# Patient Record
Sex: MALE | Race: WHITE | NOT HISPANIC OR LATINO
[De-identification: names, ages, dates, MRNs, and addresses within clinical notes are randomized per-mention and may not be internally consistent; named-entity substitution may affect disease eponyms.]

---

## 2019-06-21 ENCOUNTER — APPOINTMENT (OUTPATIENT)
Dept: ORTHOPEDIC SURGERY | Facility: CLINIC | Age: 69
End: 2019-06-21
Payer: MEDICARE

## 2019-06-21 VITALS
WEIGHT: 243 LBS | DIASTOLIC BLOOD PRESSURE: 84 MMHG | SYSTOLIC BLOOD PRESSURE: 147 MMHG | HEIGHT: 72 IN | BODY MASS INDEX: 32.91 KG/M2 | HEART RATE: 61 BPM

## 2019-06-21 DIAGNOSIS — S86.309A UNSPECIFIED INJURY OF MUSCLE(S) AND TENDON(S) OF PERONEAL MUSCLE GROUP AT LOWER LEG LEVEL, UNSPECIFIED LEG, INITIAL ENCOUNTER: ICD-10-CM

## 2019-06-21 PROCEDURE — 20612 ASPIRATE/INJ GANGLION CYST: CPT | Mod: RT

## 2019-06-21 PROCEDURE — 99204 OFFICE O/P NEW MOD 45 MIN: CPT | Mod: 25

## 2019-06-21 PROCEDURE — 73610 X-RAY EXAM OF ANKLE: CPT | Mod: RT

## 2019-06-21 NOTE — HISTORY OF PRESENT ILLNESS
[de-identified] : Patient presents today for an initial  evaluation of right lateral foot pain. He reports having pain for approximate 4-6 months. He does not recall a specific injury. He states the pain is over the lateral aspect of the foot. His knee worse when into a long distances. He is try some custom orthotics. This has helped the condition slightly. He does report his difficulty ambulating any lengthy distance. He does report intermittent swelling of the foot which is worse to the clinic end of the day. He is recently seen a podiatrist. He was sent for an MRI. MRI demonstrated peroneal tendon tear and he was referred to a foot and ankle specialist. The patient had previous right foot surgery with this office and had an exotosis of a tubercle. He denies of any medial foot pain. Her sensory changes. No other related complaints.\par \par Review of Systems-\par All other review of systems are negative except as noted. \par Constitutional: No fever or chills. \par Cardiovascular: No orthopnea or chest pain\par Pulmonary: No shortness of breath. \par Musculoskeletal: + joint pain, joint stiffness and joint swelling. \par Psychiatric: No anxiety and depression.

## 2019-06-21 NOTE — PROCEDURE
[Injection] : Injection [Right] : on the right.   [Tendon Sheath___] : [unfilled] ganglion cyst [Patient] : patient [Risk] : Risk [Benefits] : benefits [Bleeding] : bleeding [Infection] : infection [Verbal Consent Obtained] : verbal consent was obtained prior to the procedure [Ethyl Chloride Spray] : ethyl chloride spray was used as a topical anesthetic [Direct] : direct [1% Lidocaine___(mL)] : [unfilled] mL of 1% Lidocaine [Betameth. 3mg/mL___(mL)] : [unfilled] mL of 3mg/mL  betamethasone [Bandage Applied] : a bandage [No Strenuous Activity___day(s)] : avoid strenuous activity for [unfilled] day(s) [___ Week(s)] : in [unfilled] week(s) [de-identified] : chloraprep

## 2019-06-21 NOTE — DISCUSSION/SUMMARY
[de-identified] : Today's x-rays and a previous MRI reviewed with the patient. The patient was informed of the tendon tear as well as any other changes. At this time the patient is to be managed conservatively. The patient had an injection performed. He was placed into a lace up brace. He will be followed. She will return back in approximately 4 weeks reevaluation if he is not significantly improved at that time, surgical intervention may be considered. He will reduce activities to avoid exacerbation. All questions were answered to the patient's satisfaction.

## 2019-06-21 NOTE — PHYSICAL EXAM
[de-identified] : The patient appears well nourished and in no apparent distress. The patient is alert and oriented to person, place, and time. Affect and mood appear normal. The head is normocephalic and atraumatic. The eyes reveal normal sclera and extra ocular muscles are intact. The mucous membranes are moist. Skin shows normal turgor with no evidence of eczema or psoriasis. No respiratory distress noted. Sensation grossly intact. MUSCULOSKELETAL:   SEE BELOW\par \par Exam demonstrates normal alignment of the foot and ankle. No signs of acute trauma. No ecchymosis. No erythema. No tenderness the Achilles. The Achilles is intact. No calf tenderness. There is no medial ankle tenderness. No hindfoot tenderness and medial aspect. There is tenderness following the peroneus brevis inferior to the lateral malleolus. No tenderness of the fifth metatarsus space. No tenderness of the toes or the remainder of the metatarsus. Past range of motion of the ankle demonstrates approximately 5° of dorsiflexion and 40° of plantar flexion. Supination is approximately 25° and pronation is approximately 15°. There is some discomfort with terminal inversion. No pain with eversion. There is 5/5 motor strength against resisted ankle motions. There is discomfort with resisted inversion. No hemostasis. [de-identified] : MRI of the right foot dated May 24, 2002 was reviewed. No avascular necrosis. No fractures. Postsurgical changes of the calcaneus body is appreciated. Mild scarring of the lateral ligamentous complexes in her superior deltoid ligament is intact. Insertional tendinosis of the posterior tibial tendon. Complete tear of the peroneus longus attention to the cuboid tunnel with mild retraction of the torn fibers proximally with resulting full-thickness tendon gap measuring 1.7 cm in length. Severe pronation brevis tendinosis with partial thickness longitudinal tearing

## 2019-08-02 ENCOUNTER — APPOINTMENT (OUTPATIENT)
Dept: ORTHOPEDIC SURGERY | Facility: CLINIC | Age: 69
End: 2019-08-02

## 2022-06-07 ENCOUNTER — FORM ENCOUNTER (OUTPATIENT)
Age: 72
End: 2022-06-07

## 2022-06-08 ENCOUNTER — APPOINTMENT (OUTPATIENT)
Dept: ORTHOPEDIC SURGERY | Facility: CLINIC | Age: 72
End: 2022-06-08
Payer: MEDICARE

## 2022-06-08 ENCOUNTER — APPOINTMENT (OUTPATIENT)
Dept: MRI IMAGING | Facility: CLINIC | Age: 72
End: 2022-06-08
Payer: MEDICARE

## 2022-06-08 VITALS — HEIGHT: 72 IN | WEIGHT: 243 LBS | BODY MASS INDEX: 32.91 KG/M2

## 2022-06-08 PROCEDURE — 73721 MRI JNT OF LWR EXTRE W/O DYE: CPT | Mod: LT,MH

## 2022-06-08 PROCEDURE — 99214 OFFICE O/P EST MOD 30 MIN: CPT

## 2022-06-08 PROCEDURE — 73522 X-RAY EXAM HIPS BI 3-4 VIEWS: CPT

## 2022-06-08 RX ORDER — ZOLPIDEM TARTRATE 10 MG/1
10 TABLET ORAL
Qty: 30 | Refills: 0 | Status: ACTIVE | COMMUNITY
Start: 2022-03-25

## 2022-06-08 RX ORDER — TADALAFIL 20 MG/1
20 TABLET ORAL
Qty: 10 | Refills: 0 | Status: ACTIVE | COMMUNITY
Start: 2021-06-16

## 2022-06-08 NOTE — DISCUSSION/SUMMARY
[Medication Risks Reviewed] : Medication risks reviewed [de-identified] : Moist heat p.r.n.\par Discontinue Celebrex\par He can try diclofenac 75 mg b.i.d. 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

## 2022-06-08 NOTE — HISTORY OF PRESENT ILLNESS
[Gradual] : gradual [10] : 10 [Dull/Aching] : dull/aching [Radiating] : radiating [Sharp] : sharp [Constant] : constant [Leisure] : leisure [Nothing helps with pain getting better] : Nothing helps with pain getting better [Retired] : Work status: retired [de-identified] : The patient has had increased pain in both hips over the past 2 months.  No injury.  He had been in [place] and had been walking on uneven stand.  The left side bothers him more than the right.  He has mild to moderate pain standing and walking.  Pain after sitting and getting up.  His hips feel achy and sore.  Occasional awakening from sleep at night.  Some discomfort in his back at the present time.  No pain.  No radicular complaints.  Celebrex has not been helping him [] : Post Surgical Visit: no [FreeTextEntry7] : B Thighs  [de-identified] : 9/29/2021 [de-identified] : Dr. Hoover

## 2022-06-08 NOTE — PHYSICAL EXAM
[Normal Mood and Affect] : normal mood and affect [Able to Communicate] : able to communicate [Well Developed] : well developed [Well Nourished] : well nourished [FreeTextEntry3] : Healed midline incision [de-identified] : Straight leg raising is negative bilaterally [] : no swelling [4___] : abduction 4[unfilled]/5 [Bilateral] : hip with pelvis bilaterally [FreeTextEntry8] : Mild to moderate tenderness over the abductor insertion bilaterally [FreeTextEntry9] : Reviewed and interpreted.  Pelvis AP with lateral both hips-moderate to severe degenerative changes of the right hip.  Moderate degenerative changes of the left hip

## 2022-06-12 ENCOUNTER — NON-APPOINTMENT (OUTPATIENT)
Age: 72
End: 2022-06-12

## 2022-06-13 ENCOUNTER — APPOINTMENT (OUTPATIENT)
Dept: ORTHOPEDIC SURGERY | Facility: CLINIC | Age: 72
End: 2022-06-13
Payer: MEDICARE

## 2022-06-13 VITALS — HEIGHT: 72 IN | WEIGHT: 243 LBS | BODY MASS INDEX: 32.91 KG/M2

## 2022-06-13 DIAGNOSIS — R93.5 ABNORMAL FINDINGS ON DIAGNOSTIC IMAGING OF OTHER ABDOMINAL REGIONS, INCLUDING RETROPERITONEUM: ICD-10-CM

## 2022-06-13 DIAGNOSIS — M70.61 TROCHANTERIC BURSITIS, RIGHT HIP: ICD-10-CM

## 2022-06-13 DIAGNOSIS — M70.62 TROCHANTERIC BURSITIS, RIGHT HIP: ICD-10-CM

## 2022-06-13 PROCEDURE — 99214 OFFICE O/P EST MOD 30 MIN: CPT

## 2022-06-13 RX ORDER — CELECOXIB 200 MG/1
200 CAPSULE ORAL
Qty: 90 | Refills: 0 | Status: DISCONTINUED | COMMUNITY
Start: 2022-03-31 | End: 2022-06-13

## 2022-06-13 NOTE — DATA REVIEWED
[MRI] : MRI [Left] : left [Hip] : hip [FreeTextEntry1] : MRI left hip done June 8, 2022 was reviewed. There are mild to moderate degenerative changes. Moderate degenerative changes the right hip. Increase signal changes abductor tendons bilaterally. Bilateral trochanteric bursitis. \par

## 2022-06-13 NOTE — DISCUSSION/SUMMARY
[Medication Risks Reviewed] : Medication risks reviewed [de-identified] : MRI results left hip were discussed with the patient\par Moist heat p.r.n.\par Diclofenac 75 mg b.i.d. 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

## 2022-06-13 NOTE — HISTORY OF PRESENT ILLNESS
[Gradual] : gradual [4] : 4 [Dull/Aching] : dull/aching [Intermittent] : intermittent [Leisure] : leisure [Retired] : Work status: retired [de-identified] : The patient has continued pain in both hips.  The left side bothers him more than the right.  He has mild pain on the right, mild to moderate pain on the left.  Pain when standing and walking.  Pain after sitting and getting up.  Occasional awakening from sleep at night. He had MRI left hip [] : Post Surgical Visit: no [de-identified] : MRI

## 2022-06-13 NOTE — PHYSICAL EXAM
01/07/20 1802   Child Life   Location ED   Intervention Initial Assessment;Developmental Play  (CFL introduced self/services and provided toys for normalization of environment)   Anxiety Appropriate   Techniques to [place] with Loss/Stress/Change diversional activity;family presence      [Normal Mood and Affect] : normal mood and affect [Able to Communicate] : able to communicate [Well Developed] : well developed [Well Nourished] : well nourished [4___] : abduction 4[unfilled]/5 [FreeTextEntry3] : Healed midline incision [de-identified] : Straight leg raising is negative bilaterally [Bilateral] : hip bilaterally [] : no swelling [FreeTextEntry8] : Mild tenderness over the abductor insertion on the right, mild to moderate tenderness abductor insertion on the left [FreeTextEntry9] : Mild restriction of internal and external rotation right hip.  No pain with passive motion of the hips [de-identified] : Abductors 5 minus/5 on the right, 4+/5 on the left [de-identified] : Slight dystrophic gait

## 2022-08-01 ENCOUNTER — APPOINTMENT (OUTPATIENT)
Dept: ORTHOPEDIC SURGERY | Facility: CLINIC | Age: 72
End: 2022-08-01

## 2022-08-04 ENCOUNTER — RX RENEWAL (OUTPATIENT)
Age: 72
End: 2022-08-04

## 2022-08-08 ENCOUNTER — APPOINTMENT (OUTPATIENT)
Dept: ORTHOPEDIC SURGERY | Facility: CLINIC | Age: 72
End: 2022-08-08

## 2022-08-08 VITALS — WEIGHT: 243 LBS | BODY MASS INDEX: 32.91 KG/M2 | HEIGHT: 72 IN

## 2022-08-08 DIAGNOSIS — M19.041 PRIMARY OSTEOARTHRITIS, RIGHT HAND: ICD-10-CM

## 2022-08-08 DIAGNOSIS — M19.049 PRIMARY OSTEOARTHRITIS, UNSPECIFIED HAND: ICD-10-CM

## 2022-08-08 PROCEDURE — 20605 DRAIN/INJ JOINT/BURSA W/O US: CPT

## 2022-08-08 PROCEDURE — 73130 X-RAY EXAM OF HAND: CPT | Mod: RT

## 2022-08-08 PROCEDURE — 99213 OFFICE O/P EST LOW 20 MIN: CPT | Mod: 25

## 2022-08-08 RX ORDER — METHYLPREDNISOLONE ACETATE 40 MG/ML
40 INJECTION, SUSPENSION INTRA-ARTICULAR; INTRALESIONAL; INTRAMUSCULAR; SOFT TISSUE
Refills: 0 | Status: COMPLETED | OUTPATIENT
Start: 2022-08-08

## 2022-08-08 RX ADMIN — METHYLPREDNISOLONE ACETATE MG/ML: 40 INJECTION, SUSPENSION INTRA-ARTICULAR; INTRALESIONAL; INTRAMUSCULAR; SOFT TISSUE at 00:00

## 2022-08-08 NOTE — HISTORY OF PRESENT ILLNESS
[Gradual] : gradual [9] : 9 [3] : 3 [Dull/Aching] : dull/aching [Sharp] : sharp [Intermittent] : intermittent [Household chores] : household chores [Rest] : rest [Retired] : Work status: retired [de-identified] : Patient is a 71-year-old right-hand-dominant male with pain in the region of the base of the right thumb over the past 2 weeks.  No injury.  He had been doing a lot of bike riding.  He has moderate sharp pain when lifting and grasping.  Pain opening jars.  No numbness or tingling.  Taking Celebrex p.r.n.  He says this helps him more than the diclofenac [] : Post Surgical Visit: no [FreeTextEntry7] : R Forearm

## 2022-08-08 NOTE — PROCEDURE
[Medium Joint Injection] : medium joint injection [Right] : of the right [CMC Joint] : CMC joint [Pain] : pain [Alcohol] : alcohol [Betadine] : betadine [Ethyl Chloride sprayed topically] : ethyl chloride sprayed topically [Sterile technique used] : sterile technique used [___ cc    1%] : Lidocaine ~Vcc of 1%  [___ cc    40mg] : Methylprednisolone (Depomedrol) ~Vcc of 40 mg  [] : Patient tolerated procedure well [Patient was advised to rest the joint(s) for ____ days] : patient was advised to rest the joint(s) for [unfilled] days [Risks, benefits, alternatives discussed / Verbal consent obtained] : the risks benefits, and alternatives have been discussed, and verbal consent was obtained [FreeTextEntry3] : Do not submerge underwater for 24 hours

## 2022-08-08 NOTE — IMAGING
[de-identified] : Right hand\par \par No swelling\par Full active motion of the fingers and wrist\par Moderate tenderness over the basal joint\par Negative Finkelstein test\par Intrinsic strength is intact\par Sensation intact\par Radial pulse 2+ [Right] : right hand [FreeTextEntry1] : Reviewed and interpreted.  Right hand AP, lateral and oblique-moderate degenerative changes of the basal joint

## 2022-08-08 NOTE — DISCUSSION/SUMMARY
[Medication Risks Reviewed] : Medication risks reviewed [de-identified] : I offered the patient a cortisone injection in the basal joint.  Risks benefits and the alternatives were discussed.  He wanted to try this\par He did not want to use a splint\par Ice p.r.n.\par Discontinue diclofenac\par Celebrex 200 mg b.i.d. with food p.r.n.\par Tylenol p.r.n.\par He will avoid irritating activities\par \par Impression:\par Moderate basal joint osteoarthritis right hand

## 2022-10-03 ENCOUNTER — RX RENEWAL (OUTPATIENT)
Age: 72
End: 2022-10-03

## 2022-12-01 ENCOUNTER — APPOINTMENT (OUTPATIENT)
Dept: ORTHOPEDIC SURGERY | Facility: CLINIC | Age: 72
End: 2022-12-01

## 2022-12-01 VITALS — BODY MASS INDEX: 32.91 KG/M2 | WEIGHT: 243 LBS | HEIGHT: 72 IN

## 2022-12-01 PROCEDURE — 99213 OFFICE O/P EST LOW 20 MIN: CPT

## 2022-12-01 PROCEDURE — 73562 X-RAY EXAM OF KNEE 3: CPT | Mod: 50

## 2022-12-01 RX ORDER — DICLOFENAC SODIUM 75 MG/1
75 TABLET, DELAYED RELEASE ORAL
Qty: 60 | Refills: 1 | Status: COMPLETED | COMMUNITY
Start: 2022-06-08 | End: 2022-12-01

## 2022-12-01 NOTE — IMAGING
[Bilateral] : knee bilaterally [de-identified] : Mild dystrophic gait\par \par Right knee\par No swelling\par Mild medial facet and joint line tenderness\par Passive range of motion 0° to 120°\par Ligaments are stable\par Quad strength 5/5\par \par Left knee\par No swelling\par Mild medial facet and joint line tenderness\par Passive range of motion 0° to 120°\par Ligaments are stable\par Quad strength 5/5\par \par Both legs\par No swelling\par Calves are soft and nontender\par Posterior tibial pulse 2+ bilaterally [FreeTextEntry9] : Reviewed and interpreted.  Right knee AP standing, lateral, sunrise views-severe degenerative changes with narrowing the medial compartment on AP standing view, spurring patellofemoral joint\par \par Reviewed and interpreted.  Left knee AP standing, lateral, sunrise views-moderate to severe degenerative changes with narrowing the medial compartment on AP standing view, spurring patellofemoral joint

## 2022-12-01 NOTE — DISCUSSION/SUMMARY
[de-identified] : Ice p.r.n.\par Celebrex 200 mg b.i.d. with food p.r.n.\par Continue home exercises\par He would like to repeat Gelsyn injections in both knees.  Risks benefits and the alternatives were discussed\par \par Impression:\par Severe osteoarthritis right knee\par Moderate to severe osteoarthritis left knee

## 2022-12-01 NOTE — HISTORY OF PRESENT ILLNESS
[Gradual] : gradual [3] : 3 [Dull/Aching] : dull/aching [Intermittent] : intermittent [Leisure] : leisure [Rest] : rest [Injection therapy] : injection therapy [Stairs] : stairs [Retired] : Work status: retired [de-identified] : The patient has had increased pain in both knees over the past few weeks.  Mild pain standing and walking.  Mild to moderate pain with stairs and movie sign.  Taking Celebrex p.r.n.  Doing home exercises.  He did have good improvement after previous Gelsyn injections in both knees.  Seen today with his wife, Yanique [] : Post Surgical Visit: no [de-identified] : 3/24/2022 [de-identified] : Dr. Hoover

## 2022-12-08 ENCOUNTER — APPOINTMENT (OUTPATIENT)
Dept: ORTHOPEDIC SURGERY | Facility: CLINIC | Age: 72
End: 2022-12-08

## 2022-12-08 VITALS — HEIGHT: 72 IN | WEIGHT: 243 LBS | BODY MASS INDEX: 32.91 KG/M2

## 2022-12-08 PROCEDURE — 20611 DRAIN/INJ JOINT/BURSA W/US: CPT | Mod: 50

## 2022-12-08 RX ORDER — HYALURONATE SODIUM 16.8MG/2ML
16.8 SYRINGE (ML) INTRAARTICULAR
Refills: 0 | Status: COMPLETED | OUTPATIENT
Start: 2022-12-08

## 2022-12-08 RX ADMIN — Medication MG/2ML: at 00:00

## 2022-12-08 NOTE — IMAGING
[de-identified] : Mild dystrophic gait\par \par Right knee\par No swelling\par Mild medial facet and joint line tenderness\par Passive range of motion 0° to 120°\par \par Left knee\par No swelling\par Mild medial facet and joint line tenderness\par Passive range of motion 0° to 120°\par \par \par Both legs\par No swelling\par Calves are soft and nontender\par

## 2022-12-08 NOTE — HISTORY OF PRESENT ILLNESS
[Gradual] : gradual [3] : 3 [Dull/Aching] : dull/aching [Intermittent] : intermittent [Leisure] : leisure [Rest] : rest [Injection therapy] : injection therapy [Stairs] : stairs [Retired] : Work status: retired [1] : 1 [Gelsyn] : Gelsyn [de-identified] : The patient has continued pain in both knees.  Mild pain standing and walking.  Mild to moderate pain after sitting and getting up [] : Post Surgical Visit: no [de-identified] : 12/1/2022 [de-identified] : Dr. Hoover

## 2022-12-08 NOTE — DISCUSSION/SUMMARY
[de-identified] : Ice p.r.n.\par Celebrex 200 mg b.i.d. with food p.r.n.\par Continue home exercises\par He would like to repeat Gelsyn injections in both knees.  Risks benefits and the alternatives were discussed\par \par Impression:\par Severe osteoarthritis right knee\par Moderate to severe osteoarthritis left knee

## 2022-12-08 NOTE — PROCEDURE
[Large Joint Injection] : Large joint injection [Bilateral] : bilaterally of the [Knee] : knee [Pain] : pain [Alcohol] : alcohol [Betadine] : betadine [Ethyl Chloride sprayed topically] : ethyl chloride sprayed topically [Sterile technique used] : sterile technique used [Gel-Syn (16.8mg)] : 16.8mg of Gel-Syn [#1] : series #1 [] : Patient tolerated procedure well [Patient was advised to rest the joint(s) for ____ days] : patient was advised to rest the joint(s) for [unfilled] days [Risks, benefits, alternatives discussed / Verbal consent obtained] : the risks benefits, and alternatives have been discussed, and verbal consent was obtained [Altered anatomic landmarks d/t erosive arthritis] : altered anatomic landmarks d/t erosive arthritis [All ultrasound images have been permanently captured and stored accordingly in our picture archiving and communication system] : All ultrasound images have been permanently captured and stored accordingly in our picture archiving and communication system [FreeTextEntry3] : Do not submerge underwater for 24 hours

## 2022-12-15 ENCOUNTER — APPOINTMENT (OUTPATIENT)
Dept: ORTHOPEDIC SURGERY | Facility: CLINIC | Age: 72
End: 2022-12-15

## 2022-12-15 VITALS — WEIGHT: 243 LBS | BODY MASS INDEX: 32.91 KG/M2 | HEIGHT: 72 IN

## 2022-12-15 PROCEDURE — 20611 DRAIN/INJ JOINT/BURSA W/US: CPT | Mod: 50

## 2022-12-15 RX ORDER — HYALURONATE SODIUM 16.8MG/2ML
16.8 SYRINGE (ML) INTRAARTICULAR
Refills: 0 | Status: COMPLETED | OUTPATIENT
Start: 2022-12-15

## 2022-12-15 RX ADMIN — Medication MG/2ML: at 00:00

## 2022-12-15 NOTE — IMAGING
[de-identified] : Mild dystrophic gait\par \par Right knee\par No swelling\par Mild medial facet and joint line tenderness\par Passive range of motion 0° to 120°\par \par Left knee\par No swelling\par Mild medial facet and joint line tenderness\par Passive range of motion 0° to 120°\par \par \par Both legs\par No swelling\par Calves are soft and nontender\par

## 2022-12-15 NOTE — DISCUSSION/SUMMARY
[de-identified] : Ice p.r.n.\par Celebrex 200 mg b.i.d. with food p.r.n.\par Continue home exercises\par \par Impression:\par Severe osteoarthritis right knee\par Moderate to severe osteoarthritis left knee

## 2022-12-15 NOTE — PROCEDURE
[Large Joint Injection] : Large joint injection [Bilateral] : bilaterally of the [Knee] : knee [Pain] : pain [Alcohol] : alcohol [Betadine] : betadine [Ethyl Chloride sprayed topically] : ethyl chloride sprayed topically [Sterile technique used] : sterile technique used [Gel-Syn (16.8mg)] : 16.8mg of Gel-Syn [#2] : series #2 [] : Patient tolerated procedure well [Patient was advised to rest the joint(s) for ____ days] : patient was advised to rest the joint(s) for [unfilled] days [Risks, benefits, alternatives discussed / Verbal consent obtained] : the risks benefits, and alternatives have been discussed, and verbal consent was obtained [Altered anatomic landmarks d/t erosive arthritis] : altered anatomic landmarks d/t erosive arthritis [All ultrasound images have been permanently captured and stored accordingly in our picture archiving and communication system] : All ultrasound images have been permanently captured and stored accordingly in our picture archiving and communication system [FreeTextEntry3] : Do not submerge underwater for 24 hours

## 2022-12-15 NOTE — HISTORY OF PRESENT ILLNESS
[Gradual] : gradual [Dull/Aching] : dull/aching [Intermittent] : intermittent [Leisure] : leisure [Stairs] : stairs [Retired] : Work status: retired [3] : 3 [Gelsyn] : Gelsyn [de-identified] : The patient feels better after the first Gelsyn injections in both knees.  He has mild pain standing and walking.  Mild pain with stairs [] : Post Surgical Visit: no [de-identified] : 12/8/2022

## 2022-12-22 ENCOUNTER — APPOINTMENT (OUTPATIENT)
Dept: ORTHOPEDIC SURGERY | Facility: CLINIC | Age: 72
End: 2022-12-22

## 2022-12-22 VITALS — BODY MASS INDEX: 32.91 KG/M2 | WEIGHT: 243 LBS | HEIGHT: 72 IN

## 2022-12-22 PROCEDURE — 20611 DRAIN/INJ JOINT/BURSA W/US: CPT | Mod: 50

## 2022-12-22 RX ORDER — HYALURONATE SODIUM 16.8MG/2ML
16.8 SYRINGE (ML) INTRAARTICULAR
Refills: 0 | Status: COMPLETED | OUTPATIENT
Start: 2022-12-22

## 2022-12-22 RX ADMIN — Medication MG/2ML: at 00:00

## 2022-12-22 NOTE — PROCEDURE
[Large Joint Injection] : Large joint injection [Bilateral] : bilaterally of the [Knee] : knee [Pain] : pain [Alcohol] : alcohol [Betadine] : betadine [Ethyl Chloride sprayed topically] : ethyl chloride sprayed topically [Sterile technique used] : sterile technique used [Gel-Syn (16.8mg)] : 16.8mg of Gel-Syn [#3] : series #3 [] : Patient tolerated procedure well [Patient was advised to rest the joint(s) for ____ days] : patient was advised to rest the joint(s) for [unfilled] days [Risks, benefits, alternatives discussed / Verbal consent obtained] : the risks benefits, and alternatives have been discussed, and verbal consent was obtained [Altered anatomic landmarks d/t erosive arthritis] : altered anatomic landmarks d/t erosive arthritis [All ultrasound images have been permanently captured and stored accordingly in our picture archiving and communication system] : All ultrasound images have been permanently captured and stored accordingly in our picture archiving and communication system [FreeTextEntry3] : Do not submerge underwater for 24 hours

## 2022-12-22 NOTE — DISCUSSION/SUMMARY
[de-identified] : Ice p.r.n.\par Celebrex 200 mg b.i.d. with food p.r.n.\par Continue home exercises\par \par \par Impression:\par Severe osteoarthritis right knee\par Moderate to severe osteoarthritis left knee

## 2022-12-22 NOTE — HISTORY OF PRESENT ILLNESS
[Gradual] : gradual [Dull/Aching] : dull/aching [Intermittent] : intermittent [Leisure] : leisure [Rest] : rest [Injection therapy] : injection therapy [Stairs] : stairs [Retired] : Work status: retired [3] : 3 [Gelsyn] : Gelsyn [de-identified] : The patient continues to improve after the second Gelsyn injections in both knees.  He has mild pain standing and walking [] : Post Surgical Visit: no [de-identified] : 12/15/2022

## 2022-12-22 NOTE — IMAGING
[de-identified] : Mild dystrophic gait\par \par Right knee\par No swelling\par Mild medial facet and joint line tenderness\par Passive range of motion 0° to 120°\par \par Left knee\par No swelling\par Mild medial facet and joint line tenderness\par Passive range of motion 0° to 120°\par \par \par Both legs\par No swelling\par Calves are soft and nontender\par

## 2023-02-23 ENCOUNTER — OFFICE (OUTPATIENT)
Dept: URBAN - METROPOLITAN AREA CLINIC 109 | Facility: CLINIC | Age: 73
Setting detail: OPHTHALMOLOGY
End: 2023-02-23
Payer: MEDICARE

## 2023-02-23 DIAGNOSIS — H52.7: ICD-10-CM

## 2023-02-23 DIAGNOSIS — H25.13: ICD-10-CM

## 2023-02-23 DIAGNOSIS — H52.4: ICD-10-CM

## 2023-02-23 PROCEDURE — 92014 COMPRE OPH EXAM EST PT 1/>: CPT | Performed by: OPHTHALMOLOGY

## 2023-02-23 PROCEDURE — 92015 DETERMINE REFRACTIVE STATE: CPT | Performed by: OPHTHALMOLOGY

## 2023-02-23 ASSESSMENT — AXIALLENGTH_DERIVED
OD_AL: 22.5977
OS_AL: 22.42
OD_AL: 22.6874

## 2023-02-23 ASSESSMENT — CONFRONTATIONAL VISUAL FIELD TEST (CVF)
OD_FINDINGS: FULL
OS_FINDINGS: FULL

## 2023-02-23 ASSESSMENT — KERATOMETRY
OS_K2POWER_DIOPTERS: 45.12
OS_K1POWER_DIOPTERS: 45.12
OD_K1POWER_DIOPTERS: 45.00
OD_K2POWER_DIOPTERS: 45.00

## 2023-02-23 ASSESSMENT — REFRACTION_AUTOREFRACTION
OD_CYLINDER: -0.50
OS_CYLINDER: -0.75
OD_AXIS: 93
OS_SPHERE: +2.00
OD_SPHERE: +1.50
OS_AXIS: 15

## 2023-02-23 ASSESSMENT — SPHEQUIV_DERIVED
OD_SPHEQUIV: 1
OS_SPHEQUIV: 1.625
OD_SPHEQUIV: 1.25

## 2023-02-23 ASSESSMENT — REFRACTION_MANIFEST
OS_SPHERE: +1.25
OD_SPHERE: +1.25
OD_CYLINDER: -0.50
OS_VA1: 20/25
OD_ADD: +2.00
OD_VA1: 20/25
OD_AXIS: 90
OS_ADD: +2.00

## 2023-02-23 ASSESSMENT — VISUAL ACUITY
OS_BCVA: 20/40-1
OD_BCVA: 20/40-2

## 2023-02-23 ASSESSMENT — LID EXAM ASSESSMENTS
OD_MEIBOMITIS: RLL RUL 1+
OS_MEIBOMITIS: LLL LUL 1+

## 2023-02-27 ENCOUNTER — FORM ENCOUNTER (OUTPATIENT)
Age: 73
End: 2023-02-27

## 2023-02-28 ENCOUNTER — APPOINTMENT (OUTPATIENT)
Dept: MRI IMAGING | Facility: CLINIC | Age: 73
End: 2023-02-28
Payer: MEDICARE

## 2023-02-28 ENCOUNTER — APPOINTMENT (OUTPATIENT)
Dept: ORTHOPEDIC SURGERY | Facility: CLINIC | Age: 73
End: 2023-02-28
Payer: MEDICARE

## 2023-02-28 VITALS — HEIGHT: 72 IN | WEIGHT: 243 LBS | BODY MASS INDEX: 32.91 KG/M2

## 2023-02-28 DIAGNOSIS — S16.1XXA STRAIN OF MUSCLE, FASCIA AND TENDON AT NECK LEVEL, INITIAL ENCOUNTER: ICD-10-CM

## 2023-02-28 DIAGNOSIS — R42 DIZZINESS AND GIDDINESS: ICD-10-CM

## 2023-02-28 DIAGNOSIS — M47.812 SPONDYLOSIS W/OUT MYELOPATHY OR RADICULOPATHY, CERVICAL REGION: ICD-10-CM

## 2023-02-28 PROCEDURE — 72141 MRI NECK SPINE W/O DYE: CPT | Mod: MH

## 2023-02-28 PROCEDURE — 72050 X-RAY EXAM NECK SPINE 4/5VWS: CPT

## 2023-02-28 PROCEDURE — 99214 OFFICE O/P EST MOD 30 MIN: CPT

## 2023-02-28 RX ORDER — MUPIROCIN 20 MG/G
2 OINTMENT TOPICAL
Qty: 22 | Refills: 0 | Status: COMPLETED | COMMUNITY
Start: 2022-08-20 | End: 2023-02-28

## 2023-02-28 NOTE — DISCUSSION/SUMMARY
[de-identified] : The patient will have MRI cervical spine\par Recommend he have neurology consult with Dr. Josette Uribe\par Recommend he has followup medical evaluation with his primary care physician Dr. Ayala to rule out any cardiac pathology\par Recommend he take Tylenol instead of Celebrex p.r.n.\par Moist heat p.r.n.\par If for any reason he develops any persistent severe headaches or chest pain or shortness of breath, advised to go to emergency room immediately for evaluation\par \par Impression:\par Cervical strain/spondylosis\par Vertigo

## 2023-02-28 NOTE — IMAGING
[de-identified] : Cervical spine\par Inspection normal\par Mild tenderness trapezius bilaterally with mild spasm\par Mildly decreased active range of motion\par Motor exam upper extremities normal\par -negative foraminal closing test bilaterally\par \par Shoulders\par Full active motion without tenderness\par Radial pulse 2+ bilaterally [FreeTextEntry1] : Reviewed and interpreted.  Cervical spine AP, lateral, flexion, extension views-moderate disc space narrowing C3-4 through C5-6

## 2023-02-28 NOTE — HISTORY OF PRESENT ILLNESS
[Neck] : neck [Gradual] : gradual [4] : 4 [Dull/Aching] : dull/aching [Intermittent] : intermittent [Lying in bed] : lying in bed [Retired] : Work status: retired [de-identified] : Patient is a 72-year-old male with onset of neck pain, mild headaches and dizziness 6 weeks ago.  No injury.  He saw his primary care physician Dr. Ayala.  He had evaluation.  He was referred for ENT evaluation and had workup which she says has been negative.  He was started on meclizine which helps him. He has mild pain and stiffness in his neck with movement.  Mild headaches.  No radicular complaints.  No numbness or weakness in his upper extremities.  He has occasional episodes of dizziness when he is lying down.  No chest pain or shortness of breath.  He has occasional heaviness in his chest [] : Post Surgical Visit: no

## 2023-03-02 ENCOUNTER — NON-APPOINTMENT (OUTPATIENT)
Age: 73
End: 2023-03-02

## 2023-03-05 ENCOUNTER — NON-APPOINTMENT (OUTPATIENT)
Age: 73
End: 2023-03-05

## 2023-03-09 ENCOUNTER — APPOINTMENT (OUTPATIENT)
Dept: ORTHOPEDIC SURGERY | Facility: CLINIC | Age: 73
End: 2023-03-09

## 2023-03-20 ENCOUNTER — NON-APPOINTMENT (OUTPATIENT)
Age: 73
End: 2023-03-20

## 2023-05-17 ENCOUNTER — NON-APPOINTMENT (OUTPATIENT)
Age: 73
End: 2023-05-17

## 2023-05-25 ENCOUNTER — APPOINTMENT (OUTPATIENT)
Dept: ORTHOPEDIC SURGERY | Facility: CLINIC | Age: 73
End: 2023-05-25
Payer: MEDICARE

## 2023-05-25 VITALS — BODY MASS INDEX: 32.91 KG/M2 | WEIGHT: 243 LBS | HEIGHT: 72 IN

## 2023-05-25 PROCEDURE — 99213 OFFICE O/P EST LOW 20 MIN: CPT

## 2023-05-25 NOTE — IMAGING
[de-identified] : Mild dystrophic gait\par \par Right knee\par No swelling\par Mild medial facet and joint line tenderness\par Passive range of motion 0° to 120°\par Ligaments are stable\par Quad strength 5/5\par \par Left knee\par No swelling\par Mild medial facet and joint line tenderness\par Passive range of motion 0° to 120°\par Ligaments are stable\par Quad strength 5/5\par \par \par Both legs\par No swelling\par Calves are soft and nontender\par

## 2023-05-25 NOTE — HISTORY OF PRESENT ILLNESS
[Gradual] : gradual [4] : 4 [Dull/Aching] : dull/aching [Intermittent] : intermittent [Leisure] : leisure [Rest] : rest [Exercising] : exercising [Stairs] : stairs [Retired] : Work status: retired [de-identified] : The patient has had increased pain in both his knees over the past couple weeks.  Mild pain standing and walking.  Mild to moderate pain with stairs and movie sign.  He is recovering from cervical spine decompression and fusion by Dr. Aureliano Snyder 2 weeks ago.  Off NSAIDs.  He did have good improvement after previous Gelsyn injections.  Seen today with his wife, Yanique [] : Post Surgical Visit: no [de-identified] : 12/22/2022 [de-identified] : Dr. Hoover

## 2023-05-25 NOTE — DISCUSSION/SUMMARY
[de-identified] : Ice p.r.n.\par Celebrex 200 mg b.i.d. with food p.r.n..  He will resume this when he is cleared by Dr. Aureliano Snyder.  Until then Tylenol as needed\par Continue home exercises\par He would like to repeat Gelsyn injections of both knees.  Risk benefits and the alternatives were discussed\par \par \par Impression:\par Severe osteoarthritis right knee\par Moderate to severe osteoarthritis left knee

## 2023-06-29 ENCOUNTER — APPOINTMENT (OUTPATIENT)
Dept: ORTHOPEDIC SURGERY | Facility: CLINIC | Age: 73
End: 2023-06-29
Payer: MEDICARE

## 2023-06-29 VITALS — WEIGHT: 243 LBS | HEIGHT: 72 IN | BODY MASS INDEX: 32.91 KG/M2

## 2023-06-29 PROCEDURE — 20611 DRAIN/INJ JOINT/BURSA W/US: CPT | Mod: 50

## 2023-06-29 RX ORDER — HYALURONATE SODIUM 16.8MG/2ML
16.8 SYRINGE (ML) INTRAARTICULAR
Refills: 0 | Status: COMPLETED | OUTPATIENT
Start: 2023-06-29

## 2023-06-29 RX ADMIN — Medication MG/2ML: at 00:00

## 2023-06-29 NOTE — DISCUSSION/SUMMARY
[de-identified] : Ice p.r.n.\par Celebrex 200 mg b.i.d. with food p.r.n..  He will resume this when he is cleared by Dr. Aureliano Snyder.  Until then Tylenol as needed\par Continue home exercises\par He would like to repeat Gelsyn injections of both knees.  Risk benefits and the alternatives were discussed\par \par \par Impression:\par Severe osteoarthritis right knee\par Moderate to severe osteoarthritis left knee

## 2023-06-29 NOTE — IMAGING
[de-identified] : Mild dystrophic gait\par \par Right knee\par No swelling\par Mild medial facet and joint line tenderness\par Passive range of motion 0° to 120°\par \par Left knee\par No swelling\par Mild medial facet and joint line tenderness\par Passive range of motion 0° to 120°\par \par \par Both legs\par No swelling\par Calves are soft and nontender\par

## 2023-06-29 NOTE — HISTORY OF PRESENT ILLNESS
[Gradual] : gradual [3] : 3 [Dull/Aching] : dull/aching [Intermittent] : intermittent [Leisure] : leisure [Rest] : rest [Stairs] : stairs [Retired] : Work status: retired [1] : 1 [Gelsyn] : Gelsyn [de-identified] : The patient has continued pain in both knees.  Mild pain standing and walking.  Mild pain after sitting and getting up. [] : Post Surgical Visit: no [de-identified] : 5/25/2023

## 2023-07-06 ENCOUNTER — APPOINTMENT (OUTPATIENT)
Dept: ORTHOPEDIC SURGERY | Facility: CLINIC | Age: 73
End: 2023-07-06
Payer: MEDICARE

## 2023-07-06 VITALS — BODY MASS INDEX: 32.91 KG/M2 | HEIGHT: 72 IN | WEIGHT: 243 LBS

## 2023-07-06 PROCEDURE — 20611 DRAIN/INJ JOINT/BURSA W/US: CPT | Mod: 50

## 2023-07-06 RX ORDER — HYALURONATE SODIUM 16.8MG/2ML
16.8 SYRINGE (ML) INTRAARTICULAR
Refills: 0 | Status: COMPLETED | OUTPATIENT
Start: 2023-07-06

## 2023-07-06 RX ADMIN — Medication MG/2ML: at 00:00

## 2023-07-06 NOTE — IMAGING
[de-identified] : Mild dystrophic gait\par \par Right knee\par No swelling\par Mild medial facet and joint line tenderness\par Passive range of motion 0° to 120°\par \par Left knee\par No swelling\par Mild medial facet and joint line tenderness\par Passive range of motion 0° to 120°\par \par \par Both legs\par No swelling\par Calves are soft and nontender\par

## 2023-07-06 NOTE — HISTORY OF PRESENT ILLNESS
[Gradual] : gradual [3] : 3 [Dull/Aching] : dull/aching [Intermittent] : intermittent [Leisure] : leisure [Rest] : rest [Stairs] : stairs [Retired] : Work status: retired [2] : 2 [Gelsyn] : Gelsyn [de-identified] : The patient has continued mild pain in both knees standing and walking.  Mild improvement after the first Gelsyn injections [] : Post Surgical Visit: no [de-identified] : 6/29/2023

## 2023-07-13 ENCOUNTER — APPOINTMENT (OUTPATIENT)
Dept: ORTHOPEDIC SURGERY | Facility: CLINIC | Age: 73
End: 2023-07-13
Payer: MEDICARE

## 2023-07-13 VITALS — HEIGHT: 72 IN | WEIGHT: 243 LBS | BODY MASS INDEX: 32.91 KG/M2

## 2023-07-13 PROCEDURE — 99212 OFFICE O/P EST SF 10 MIN: CPT | Mod: 25

## 2023-07-13 PROCEDURE — 20611 DRAIN/INJ JOINT/BURSA W/US: CPT | Mod: 50

## 2023-07-13 RX ORDER — HYALURONATE SODIUM 16.8MG/2ML
16.8 SYRINGE (ML) INTRAARTICULAR
Refills: 0 | Status: COMPLETED | OUTPATIENT
Start: 2023-07-13

## 2023-07-13 RX ADMIN — Medication MG/2ML: at 00:00

## 2023-07-13 NOTE — HISTORY OF PRESENT ILLNESS
[Gradual] : gradual [Dull/Aching] : dull/aching [Intermittent] : intermittent [Leisure] : leisure [Rest] : rest [Stairs] : stairs [Retired] : Work status: retired [3] : 3 [Gelsyn] : Gelsyn [de-identified] : The patient feels better after the second Gelsyn injections in both knees.  He has mild pain standing and walking [] : Post Surgical Visit: no [de-identified] : 7/6/2023

## 2023-07-13 NOTE — DISCUSSION/SUMMARY
[de-identified] : Follow-up plan was outlined and reviewed with the patient\par I discussed possible cortisone injections if ongoing symptoms\par I discussed possible total knee replacements\par I discussed repeating Gelsyn injections if he has good response\par Ice p.r.n.\par Celebrex 200 mg b.i.d. with food p.r.n..  He will resume this when he is cleared by Dr. Aureliano Snyder.  Until then Tylenol as needed\par Continue home exercises\par \par \par Impression:\par Severe osteoarthritis right knee\par Moderate to severe osteoarthritis left knee

## 2023-11-22 RX ORDER — CELECOXIB 200 MG/1
200 CAPSULE ORAL
Qty: 180 | Refills: 0 | Status: ACTIVE | COMMUNITY
Start: 2023-05-25 | End: 1900-01-01

## 2023-12-06 ENCOUNTER — OFFICE (OUTPATIENT)
Dept: URBAN - METROPOLITAN AREA CLINIC 109 | Facility: CLINIC | Age: 73
Setting detail: OPHTHALMOLOGY
End: 2023-12-06
Payer: MEDICARE

## 2023-12-06 DIAGNOSIS — H43.392: ICD-10-CM

## 2023-12-06 DIAGNOSIS — H25.13: ICD-10-CM

## 2023-12-06 PROCEDURE — 92014 COMPRE OPH EXAM EST PT 1/>: CPT | Performed by: OPHTHALMOLOGY

## 2023-12-06 ASSESSMENT — REFRACTION_MANIFEST
OD_CYLINDER: -0.50
OS_VA1: 20/25
OD_ADD: +2.00
OS_ADD: +2.00
OD_SPHERE: +1.25
OD_AXIS: 90
OS_SPHERE: +1.25
OD_VA1: 20/25

## 2023-12-06 ASSESSMENT — LID EXAM ASSESSMENTS
OD_MEIBOMITIS: RLL RUL 1+
OS_MEIBOMITIS: LLL LUL 1+

## 2023-12-06 ASSESSMENT — REFRACTION_AUTOREFRACTION
OS_CYLINDER: -1.25
OD_SPHERE: +1.50
OD_AXIS: 109
OD_CYLINDER: -1.00
OS_AXIS: 74
OS_SPHERE: +2.00

## 2023-12-06 ASSESSMENT — CONFRONTATIONAL VISUAL FIELD TEST (CVF)
OD_FINDINGS: FULL
OS_FINDINGS: FULL

## 2023-12-06 ASSESSMENT — SPHEQUIV_DERIVED
OS_SPHEQUIV: 1.375
OD_SPHEQUIV: 1
OD_SPHEQUIV: 1

## 2023-12-26 ENCOUNTER — APPOINTMENT (OUTPATIENT)
Dept: ORTHOPEDIC SURGERY | Facility: CLINIC | Age: 73
End: 2023-12-26
Payer: MEDICARE

## 2023-12-26 VITALS — WEIGHT: 243 LBS | HEIGHT: 72 IN | BODY MASS INDEX: 32.91 KG/M2

## 2023-12-26 DIAGNOSIS — M16.0 BILATERAL PRIMARY OSTEOARTHRITIS OF HIP: ICD-10-CM

## 2023-12-26 PROCEDURE — 72100 X-RAY EXAM L-S SPINE 2/3 VWS: CPT

## 2023-12-26 PROCEDURE — 73522 X-RAY EXAM HIPS BI 3-4 VIEWS: CPT

## 2023-12-26 PROCEDURE — 99214 OFFICE O/P EST MOD 30 MIN: CPT

## 2023-12-26 RX ORDER — SERTRALINE HYDROCHLORIDE 100 MG/1
100 TABLET, FILM COATED ORAL
Qty: 90 | Refills: 0 | Status: ACTIVE | COMMUNITY
Start: 2023-12-19

## 2023-12-26 RX ORDER — MECLIZINE HYDROCHLORIDE 25 MG/1
25 TABLET ORAL
Refills: 0 | Status: COMPLETED | COMMUNITY
Start: 2023-02-28 | End: 2023-12-26

## 2023-12-26 RX ORDER — PANTOPRAZOLE 40 MG/1
40 TABLET, DELAYED RELEASE ORAL
Qty: 90 | Refills: 0 | Status: ACTIVE | COMMUNITY
Start: 2023-02-16

## 2023-12-26 RX ORDER — METHYLPREDNISOLONE 4 MG/1
4 TABLET ORAL
Qty: 1 | Refills: 0 | Status: ACTIVE | COMMUNITY
Start: 2023-12-26 | End: 1900-01-01

## 2023-12-26 RX ORDER — ALPRAZOLAM 0.5 MG/1
0.5 TABLET ORAL
Qty: 60 | Refills: 0 | Status: ACTIVE | COMMUNITY
Start: 2023-12-19

## 2023-12-26 RX ORDER — CELECOXIB 200 MG/1
200 CAPSULE ORAL
Qty: 60 | Refills: 1 | Status: COMPLETED | COMMUNITY
Start: 2022-08-08 | End: 2023-12-26

## 2023-12-26 NOTE — IMAGING
[de-identified] : Mild dystrophic gait  Lumbosacral spine Inspection-healed posterior incision Tenderness-mild tenderness paraspinals bilaterally Straight leg raising-negative bilaterally Motor exam lower extremities-normal  Right hip No pain with passive motion.  Mildly decreased internal and external rotation.  No tenderness  Left hip Full painless passive range of motion. No tenderness  Both legs No swelling Calves are soft and nontender Dorsalis pedis pulse 2+ bilaterally [FreeTextEntry1] : Reviewed and interpreted.  Lumbosacral spine AP and lateral views-mild to moderate multilevel disc space narrowing.  Status post anterior interbody fusion L3-4 with screw and spacer in place [de-identified] : Reviewed and interpreted.  Pelvis AP with lateral both hips-moderate to severe degenerative changes of the right hip.  Mild to moderate degenerative changes of the left hip

## 2023-12-26 NOTE — HISTORY OF PRESENT ILLNESS
[Lower back] : lower back [Gradual] : gradual [9] : 9 [Dull/Aching] : dull/aching [Shooting] : shooting [Intermittent] : intermittent [Walking/activity] : walking/activity [Sitting] : sitting [Lying in bed] : lying in bed [de-identified] : Patient has had lower back pain radiating down both legs over the past several months.  Pain when standing and walking.  Occasional numbness and tingling.  No weakness.  Taking Celebrex with minimal improvement.  He has history of prior back surgeries.  His most recent was anterior fusion L3-4 by Dr. Aureliano Snyder on January 9, 2020 [] : Post Surgical Visit: no [FreeTextEntry7] : B Legs

## 2023-12-26 NOTE — DISCUSSION/SUMMARY
[Medication Risks Reviewed] : Medication risks reviewed [de-identified] : The patient will have new MRI lumbosacral spine Moist heat prn Discontinue Celebrex He will try Medrol Dosepak.  He was instructed how to take this He will avoid irritating activities Recommend he schedule follow-up consult with Dr. Aureliano Snyder  Impression: Lumbosacral strain/spondylosis/radiculopathy Status post fusion L3-4 by Dr. Aureliano Snyder January 9, 2020

## 2024-01-03 ENCOUNTER — APPOINTMENT (OUTPATIENT)
Dept: ORTHOPEDIC SURGERY | Facility: CLINIC | Age: 74
End: 2024-01-03
Payer: MEDICARE

## 2024-01-03 DIAGNOSIS — M54.16 RADICULOPATHY, LUMBAR REGION: ICD-10-CM

## 2024-01-03 PROCEDURE — 73562 X-RAY EXAM OF KNEE 3: CPT | Mod: 50

## 2024-01-03 PROCEDURE — 99214 OFFICE O/P EST MOD 30 MIN: CPT

## 2024-01-03 NOTE — HISTORY OF PRESENT ILLNESS
[Gradual] : gradual [0] : 0 [Intermittent] : intermittent [Injection therapy] : injection therapy [de-identified] : The patient has continued pain in both knees.  Mild pain standing and walking.  Mild to moderate pain after sitting and getting up.  He did have good improvement after previous Gelsyn injections The patient says his back is feeling a little better after completing Medrol Dosepak.  He resumed Celebrex.  He has mild to moderate pain in his back when standing and walking.  Pain radiates to his thighs.  No weakness. [] : Post Surgical Visit: no [de-identified] : 7/13/23 [de-identified] : Dr. Hoover [de-identified] : 7/13/23

## 2024-01-03 NOTE — DISCUSSION/SUMMARY
[de-identified] : MRI lumbosacral spine was discussed with the patient He has follow-up appointment scheduled with Dr. Aureliano Snyder January 9, 2014 If for any reason he develops any significant neurologic changes, weakness in his lower extremities, loss of bowel bladder control, advised to go to emergency room immediately He would like to repeat Gelsyn injections of both knees.  Risk benefits and the alternatives were discussed Ice p.r.n. Celebrex 200 mg b.i.d. with food p.r.n..    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 Aureliano Snyder January 9, 2020. Severe osteoarthritis right knee Moderate to severe osteoarthritis left knee

## 2024-01-03 NOTE — IMAGING
[Bilateral] : knee bilaterally [de-identified] : Mild dystrophic gait  Lumbosacral spine Inspection-healed posterior incision Tenderness-mild tenderness paraspinals bilaterally Straight leg raising-negative bilaterally Motor exam lower extremities-normal  Right hip No pain with passive motion. Mildly decreased internal and external rotation. No tenderness  Left hip Full painless passive range of motion. No tenderness  Right knee No swelling Mild medial facet and joint line tenderness Passive range of motion 0 to 120 degrees Ligaments are stable  Left knee No swelling Mild medial facet and joint line tenderness Passive range of motion 0 to 120 degrees Ligaments are stable   Both legs No swelling Calves are soft and nontender Posterior tibial pulse 2+ bilaterally  [FreeTextEntry9] : Reviewed and interpreted.  Right knee AP standing, lateral, sunrise views-severe degenerative changes with narrowing of the medial compartment on AP standing view, spurring patellofemoral joint  Reviewed and interpreted.  Left knee AP standing, lateral, sunrise views-moderate to severe degenerative changes with narrowing of the medial compartment on AP standing view, spurring patellofemoral joint

## 2024-01-10 ENCOUNTER — OFFICE (OUTPATIENT)
Dept: URBAN - METROPOLITAN AREA CLINIC 109 | Facility: CLINIC | Age: 74
Setting detail: OPHTHALMOLOGY
End: 2024-01-10
Payer: MEDICARE

## 2024-01-10 DIAGNOSIS — H25.13: ICD-10-CM

## 2024-01-10 PROCEDURE — 99214 OFFICE O/P EST MOD 30 MIN: CPT | Performed by: OPHTHALMOLOGY

## 2024-01-10 ASSESSMENT — REFRACTION_MANIFEST
OD_ADD: +2.00
OD_SPHERE: +1.25
OS_VA1: 20/25
OD_VA1: 20/25
OD_CYLINDER: -0.50
OD_AXIS: 90
OS_SPHERE: +1.25
OS_ADD: +2.00

## 2024-01-10 ASSESSMENT — REFRACTION_AUTOREFRACTION
OS_SPHERE: +1.50
OD_CYLINDER: -1.25
OS_AXIS: 70
OS_CYLINDER: -0.75
OD_AXIS: 103
OD_SPHERE: +2.00

## 2024-01-10 ASSESSMENT — LID EXAM ASSESSMENTS
OD_MEIBOMITIS: RLL RUL 1+
OS_MEIBOMITIS: LLL LUL 1+

## 2024-01-10 ASSESSMENT — SPHEQUIV_DERIVED
OD_SPHEQUIV: 1.375
OD_SPHEQUIV: 1
OS_SPHEQUIV: 1.125

## 2024-01-10 ASSESSMENT — CONFRONTATIONAL VISUAL FIELD TEST (CVF)
OS_FINDINGS: FULL
OD_FINDINGS: FULL

## 2024-01-15 ENCOUNTER — APPOINTMENT (OUTPATIENT)
Dept: ORTHOPEDIC SURGERY | Facility: CLINIC | Age: 74
End: 2024-01-15
Payer: MEDICARE

## 2024-01-15 VITALS — BODY MASS INDEX: 32.91 KG/M2 | WEIGHT: 243 LBS | HEIGHT: 72 IN

## 2024-01-15 DIAGNOSIS — M48.07 SPINAL STENOSIS, LUMBOSACRAL REGION: ICD-10-CM

## 2024-01-15 DIAGNOSIS — S39.012D STRAIN OF MUSCLE, FASCIA AND TENDON OF LOWER BACK, SUBSEQUENT ENCOUNTER: ICD-10-CM

## 2024-01-15 DIAGNOSIS — M47.817 SPONDYLOSIS W/OUT MYELOPATHY OR RADICULOPATHY, LUMBOSACRAL REGION: ICD-10-CM

## 2024-01-15 PROCEDURE — 99213 OFFICE O/P EST LOW 20 MIN: CPT | Mod: 25

## 2024-01-15 PROCEDURE — 20611 DRAIN/INJ JOINT/BURSA W/US: CPT | Mod: 50

## 2024-01-15 RX ORDER — HYALURONATE SODIUM 16.8MG/2ML
16.8 SYRINGE (ML) INTRAARTICULAR
Refills: 0 | Status: COMPLETED | OUTPATIENT
Start: 2024-01-15

## 2024-01-15 RX ADMIN — Medication MG/2ML: at 00:00

## 2024-01-15 NOTE — IMAGING
[de-identified] : Mild dystrophic gait  Right knee No swelling Mild medial facet and joint line tenderness Passive range of motion 0 to 120 degrees  Left knee No swelling Mild medial facet and joint line tenderness Passive range of motion 0 to 120 degrees  Both legs No swelling Calves are soft and nontender

## 2024-01-15 NOTE — DATA REVIEWED
[MRI] : MRI [Lumbar Spine] : lumbar spine [I independently reviewed and interpreted images and report] : I independently reviewed and interpreted images and report [FreeTextEntry1] : MRI lumbosacral spine done at [facility] imaging December 9, 2023 was reviewed. There is multilevel degenerative disc disease. Severe stenosis L2-3. Small right foraminal herniation L4-5. Small left foraminal herniation L5-S1

## 2024-01-15 NOTE — DISCUSSION/SUMMARY
[de-identified] : If for any reason he develops any significant neurologic changes, weakness in his lower extremities, loss of bowel bladder control, advised to go to emergency room immediately He would like to repeat Gelsyn injections of both knees.  Risk benefits and the alternatives were discussed Ice p.r.n. Celebrex 200 mg b.i.d. with food p.r.n..    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 Aureliano Snyder January 9, 2020. Severe osteoarthritis right knee Moderate to severe osteoarthritis left knee

## 2024-01-22 ENCOUNTER — APPOINTMENT (OUTPATIENT)
Dept: ORTHOPEDIC SURGERY | Facility: CLINIC | Age: 74
End: 2024-01-22
Payer: MEDICARE

## 2024-01-22 VITALS — BODY MASS INDEX: 32.91 KG/M2 | HEIGHT: 72 IN | WEIGHT: 243 LBS

## 2024-01-22 PROCEDURE — 20611 DRAIN/INJ JOINT/BURSA W/US: CPT | Mod: 50

## 2024-01-22 RX ORDER — HYALURONATE SODIUM 16.8MG/2ML
16.8 SYRINGE (ML) INTRAARTICULAR
Refills: 0 | Status: COMPLETED | OUTPATIENT
Start: 2024-01-22

## 2024-01-22 RX ADMIN — Medication MG/2ML: at 00:00

## 2024-01-22 NOTE — PROCEDURE
[Large Joint Injection] : Large joint injection [Bilateral] : bilaterally of the [Knee] : knee [Pain] : pain [Alcohol] : alcohol [Betadine] : betadine [Ethyl Chloride sprayed topically] : ethyl chloride sprayed topically [Sterile technique used] : sterile technique used [Gel-Syn (16.8mg)] : 16.8mg of Gel-Syn [] : Patient tolerated procedure well [#2] : series #2 [Patient was advised to rest the joint(s) for ____ days] : patient was advised to rest the joint(s) for [unfilled] days [Risks, benefits, alternatives discussed / Verbal consent obtained] : the risks benefits, and alternatives have been discussed, and verbal consent was obtained [Altered anatomic landmarks d/t erosive arthritis] : altered anatomic landmarks d/t erosive arthritis [All ultrasound images have been permanently captured and stored accordingly in our picture archiving and communication system] : All ultrasound images have been permanently captured and stored accordingly in our picture archiving and communication system [FreeTextEntry3] : Do not submerge underwater for 24 hours

## 2024-01-22 NOTE — HISTORY OF PRESENT ILLNESS
[Gradual] : gradual [3] : 3 [Dull/Aching] : dull/aching [Intermittent] : intermittent [Leisure] : leisure [Rest] : rest [Stairs] : stairs [Retired] : Work status: retired [Gelsyn] : Gelsyn [2] : 2 [de-identified] : The patient feels better after the first repeat Gelsyn injections in both knees.  He has mild pain standing and walking.  Mild to moderate pain after sitting getting up.  Seen today with his wife, Yanique [] : Post Surgical Visit: no [FreeTextEntry1] : B Knees  [de-identified] : 1/15/2024

## 2024-01-22 NOTE — DISCUSSION/SUMMARY
[de-identified] : If for any reason he develops any significant neurologic changes, weakness in his lower extremities, loss of bowel bladder control, advised to go to emergency room immediately Ice p.r.n. Celebrex 200 mg b.i.d. with food p.r.n..    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 Aureliano Snyder January 9, 2020. Severe osteoarthritis right knee Moderate to severe osteoarthritis left knee

## 2024-01-22 NOTE — IMAGING
[de-identified] : Mild dystrophic gait  Right knee No swelling Mild medial facet and joint line tenderness Passive range of motion 0 to 120 degrees  Left knee No swelling Mild medial facet and joint line tenderness Passive range of motion 0 to 120 degrees  Both legs No swelling Calves are soft and nontender

## 2024-02-05 ENCOUNTER — APPOINTMENT (OUTPATIENT)
Dept: ORTHOPEDIC SURGERY | Facility: CLINIC | Age: 74
End: 2024-02-05
Payer: MEDICARE

## 2024-02-05 VITALS — HEIGHT: 72 IN | WEIGHT: 243 LBS | BODY MASS INDEX: 32.91 KG/M2

## 2024-02-05 DIAGNOSIS — M17.0 BILATERAL PRIMARY OSTEOARTHRITIS OF KNEE: ICD-10-CM

## 2024-02-05 PROCEDURE — 20611 DRAIN/INJ JOINT/BURSA W/US: CPT | Mod: 50

## 2024-02-05 RX ORDER — HYALURONATE SODIUM 16.8MG/2ML
16.8 SYRINGE (ML) INTRAARTICULAR
Refills: 0 | Status: COMPLETED | OUTPATIENT
Start: 2024-02-05

## 2024-02-05 RX ADMIN — Medication MG/2ML: at 00:00

## 2024-02-05 NOTE — DISCUSSION/SUMMARY
[de-identified] : If for any reason he develops any significant neurologic changes, weakness in his lower extremities, loss of bowel bladder control, advised to go to emergency room immediately He is scheduled for lumbar surgery with Dr. Aureliano Snyder on February 29 Ice p.r.n. Celebrex 200 mg b.i.d. with food p.r.n..    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 Aureliano Snyder January 9, 2020. Severe osteoarthritis right knee Moderate to severe osteoarthritis left knee

## 2024-02-05 NOTE — IMAGING
[de-identified] : Mild dystrophic gait  Right knee No swelling Mild medial facet and joint line tenderness Passive range of motion 0 to 120 degrees  Left knee No swelling Mild medial facet and joint line tenderness Passive range of motion 0 to 120 degrees  Both legs No swelling Calves are soft and nontender

## 2024-02-05 NOTE — HISTORY OF PRESENT ILLNESS
[Gradual] : gradual [Dull/Aching] : dull/aching [Intermittent] : intermittent [Leisure] : leisure [Rest] : rest [Exercising] : exercising [Stairs] : stairs [Retired] : Work status: retired [3] : 3 [Gelsyn] : Gelsyn [de-identified] : The patient feels better after the second repeat Gelsyn injections in both knees.  He has mild pain standing and walking.  Mild to moderate pain after sitting getting up.   [] : Post Surgical Visit: no [FreeTextEntry1] : B Knees  [de-identified] : 1/22/2024

## 2024-05-28 NOTE — DISCUSSION/SUMMARY
[de-identified] : Ice p.r.n.\par Celebrex 200 mg b.i.d. with food p.r.n..  He will resume this when he is cleared by Dr. Aureliano Snyder.  Until then Tylenol as needed\par Continue home exercises\par \par \par Impression:\par Severe osteoarthritis right knee\par Moderate to severe osteoarthritis left knee PAST SURGICAL HISTORY:  S/P  section

## 2024-08-05 ENCOUNTER — RX ONLY (RX ONLY)
Age: 74
End: 2024-08-05

## 2024-08-05 ENCOUNTER — OFFICE (OUTPATIENT)
Dept: URBAN - METROPOLITAN AREA CLINIC 109 | Facility: CLINIC | Age: 74
Setting detail: OPHTHALMOLOGY
End: 2024-08-05
Payer: MEDICARE

## 2024-08-05 DIAGNOSIS — H25.11: ICD-10-CM

## 2024-08-05 DIAGNOSIS — H25.13: ICD-10-CM

## 2024-08-05 PROCEDURE — 99213 OFFICE O/P EST LOW 20 MIN: CPT | Performed by: OPHTHALMOLOGY

## 2024-08-05 PROCEDURE — 92136 OPHTHALMIC BIOMETRY: CPT | Mod: TC | Performed by: OPHTHALMOLOGY

## 2024-08-05 PROCEDURE — 92136 OPHTHALMIC BIOMETRY: CPT | Mod: 26,RT | Performed by: OPHTHALMOLOGY

## 2024-08-05 RX ORDER — MOXIFLOXACIN 5 MG/ML
SOLUTION/ DROPS OPHTHALMIC
Qty: 1 | Refills: 1 | Status: ACTIVE | OUTPATIENT

## 2024-08-05 RX ORDER — TROPICAMIDE 10 MG/ML
SOLUTION/ DROPS OPHTHALMIC
Qty: 1 | Refills: 0 | Status: ACTIVE | OUTPATIENT

## 2024-08-05 RX ORDER — PREDNISOLONE ACETATE 10 MG/ML
SUSPENSION/ DROPS OPHTHALMIC
Qty: 1 | Refills: 3 | Status: ACTIVE | OUTPATIENT

## 2024-08-05 RX ORDER — KETOROLAC TROMETHAMINE 4 MG/ML
SOLUTION/ DROPS OPHTHALMIC
Qty: 1 | Refills: 3 | Status: ACTIVE | OUTPATIENT

## 2024-08-05 ASSESSMENT — CONFRONTATIONAL VISUAL FIELD TEST (CVF)
OD_FINDINGS: FULL
OS_FINDINGS: FULL

## 2024-08-05 ASSESSMENT — LID EXAM ASSESSMENTS
OD_MEIBOMITIS: RLL RUL 1+
OS_MEIBOMITIS: LLL LUL 1+

## 2024-08-14 ENCOUNTER — APPOINTMENT (OUTPATIENT)
Dept: ORTHOPEDIC SURGERY | Facility: CLINIC | Age: 74
End: 2024-08-14
Payer: MEDICARE

## 2024-08-14 VITALS — BODY MASS INDEX: 32.91 KG/M2 | HEIGHT: 72 IN | WEIGHT: 243 LBS

## 2024-08-14 PROCEDURE — 99214 OFFICE O/P EST MOD 30 MIN: CPT

## 2024-08-14 NOTE — HISTORY OF PRESENT ILLNESS
[Gradual] : gradual [Dull/Aching] : dull/aching [Intermittent] : intermittent [Leisure] : leisure [Rest] : rest [Exercising] : exercising [Stairs] : stairs [Retired] : Work status: retired [3] : 3 [Gelsyn] : Gelsyn [de-identified] : The patient has had increased pain in both knees over the past few weeks.  Mild pain standing and walking.  Mild to moderate pain after sitting and getting up.  He did have good improvement after previous Gelsyn injections He was cleared by Dr. Aureliano Snyder to resume Celebrex [] : Post Surgical Visit: no [FreeTextEntry1] : B Knees  [de-identified] : inj [de-identified] : 1/22/2024

## 2024-08-14 NOTE — HISTORY OF PRESENT ILLNESS
[Gradual] : gradual [Dull/Aching] : dull/aching [Intermittent] : intermittent [Leisure] : leisure [Rest] : rest [Exercising] : exercising [Stairs] : stairs [Retired] : Work status: retired [3] : 3 [Gelsyn] : Gelsyn [de-identified] : The patient has had increased pain in both knees over the past few weeks.  Mild pain standing and walking.  Mild to moderate pain after sitting and getting up.  He did have good improvement after previous Gelsyn injections He was cleared by Dr. Aureliano Snyder to resume Celebrex [] : Post Surgical Visit: no [FreeTextEntry1] : B Knees  [de-identified] : inj [de-identified] : 1/22/2024

## 2024-08-14 NOTE — IMAGING
[de-identified] : Mild dystrophic gait  Right knee No swelling Mild medial facet and joint line tenderness Passive range of motion 0 to 120 degrees Ligaments are stable Quad strength 5 /5  Left knee No swelling Mild medial facet and joint line tenderness Passive range of motion 0 to 120 degrees Ligaments are stable Quad strength 5/5   Both legs No swelling Calves are soft and nontender Posterior tibial pulse 2+ bilaterally

## 2024-08-14 NOTE — DISCUSSION/SUMMARY
[de-identified] : I discussed repeat Gelsyn injections in both knees Risks including infection, swelling, stiffness, bleeding in addition to other associated risks with joint injection, benefits and alternatives were discussed with the patient Ice p.r.n. Celebrex 200 mg b.i.d. with food p.r.n..   He has appointment with Dr. 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 Aureliano Snyder January 9, 2020. Severe osteoarthritis right knee Moderate to severe osteoarthritis left knee

## 2024-08-14 NOTE — IMAGING
[de-identified] : Mild dystrophic gait  Right knee No swelling Mild medial facet and joint line tenderness Passive range of motion 0 to 120 degrees Ligaments are stable Quad strength 5 /5  Left knee No swelling Mild medial facet and joint line tenderness Passive range of motion 0 to 120 degrees Ligaments are stable Quad strength 5/5   Both legs No swelling Calves are soft and nontender Posterior tibial pulse 2+ bilaterally

## 2024-08-14 NOTE — DISCUSSION/SUMMARY
[de-identified] : I discussed repeat Gelsyn injections in both knees Risks including infection, swelling, stiffness, bleeding in addition to other associated risks with joint injection, benefits and alternatives were discussed with the patient Ice p.r.n. Celebrex 200 mg b.i.d. with food p.r.n..   He has appointment with Dr. 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 Aureliano Snyder January 9, 2020. Severe osteoarthritis right knee Moderate to severe osteoarthritis left knee

## 2024-08-15 ENCOUNTER — AMBULATORY SURGERY CENTER (OUTPATIENT)
Dept: URBAN - METROPOLITAN AREA SURGERY 19 | Facility: SURGERY | Age: 74
Setting detail: OPHTHALMOLOGY
End: 2024-08-15
Payer: MEDICARE

## 2024-08-15 DIAGNOSIS — H52.211: ICD-10-CM

## 2024-08-15 DIAGNOSIS — H25.11: ICD-10-CM

## 2024-08-15 PROCEDURE — 66984 XCAPSL CTRC RMVL W/O ECP: CPT | Mod: RT | Performed by: OPHTHALMOLOGY

## 2024-08-15 PROCEDURE — V2788P PANOPTIX: Performed by: OPHTHALMOLOGY

## 2024-08-16 ENCOUNTER — RX ONLY (RX ONLY)
Age: 74
End: 2024-08-16

## 2024-08-16 ENCOUNTER — OFFICE (OUTPATIENT)
Dept: URBAN - METROPOLITAN AREA CLINIC 109 | Facility: CLINIC | Age: 74
Setting detail: OPHTHALMOLOGY
End: 2024-08-16
Payer: MEDICARE

## 2024-08-16 DIAGNOSIS — H25.12: ICD-10-CM

## 2024-08-16 DIAGNOSIS — Z96.1: ICD-10-CM

## 2024-08-16 PROCEDURE — 99024 POSTOP FOLLOW-UP VISIT: CPT | Performed by: OPTOMETRIST

## 2024-08-16 ASSESSMENT — LID EXAM ASSESSMENTS
OD_MEIBOMITIS: RLL RUL 1+
OS_MEIBOMITIS: LLL LUL 1+

## 2024-08-16 ASSESSMENT — CONFRONTATIONAL VISUAL FIELD TEST (CVF)
OD_FINDINGS: FULL
OS_FINDINGS: FULL

## 2024-08-21 ENCOUNTER — APPOINTMENT (OUTPATIENT)
Dept: ORTHOPEDIC SURGERY | Facility: CLINIC | Age: 74
End: 2024-08-21
Payer: MEDICARE

## 2024-08-21 VITALS — HEIGHT: 72 IN | WEIGHT: 243 LBS | BODY MASS INDEX: 32.91 KG/M2

## 2024-08-21 PROCEDURE — 20611 DRAIN/INJ JOINT/BURSA W/US: CPT | Mod: 50

## 2024-08-21 RX ORDER — HYALURONATE SODIUM 16.8MG/2ML
16.8 SYRINGE (ML) INTRAARTICULAR
Refills: 0 | Status: COMPLETED | OUTPATIENT
Start: 2024-08-21

## 2024-08-21 RX ADMIN — Medication MG/2ML: at 00:00

## 2024-08-21 NOTE — IMAGING
[de-identified] : Mild dystrophic gait  Right knee No swelling Mild medial facet and joint line tenderness Passive range of motion 0 to 120 degrees  Left knee No swelling Mild medial facet and joint line tenderness Passive range of motion 0 to 120 degrees  Both legs No swelling Calves are soft and nontender

## 2024-08-21 NOTE — HISTORY OF PRESENT ILLNESS
[Gradual] : gradual [3] : 3 [Dull/Aching] : dull/aching [Intermittent] : intermittent [Leisure] : leisure [Rest] : rest [Exercising] : exercising [Stairs] : stairs [Retired] : Work status: retired [1] : 1 [Gelsyn] : Gelsyn [de-identified] : The patient has continued pain in both knees.  Mild pain standing and walking.  Mild to moderate pain after sitting and getting up.   [] : Post Surgical Visit: no [FreeTextEntry1] : B Knees  [de-identified] : inj [de-identified] : 1/22/2024

## 2024-08-21 NOTE — HISTORY OF PRESENT ILLNESS
[Gradual] : gradual [3] : 3 [Dull/Aching] : dull/aching [Intermittent] : intermittent [Leisure] : leisure [Rest] : rest [Exercising] : exercising [Stairs] : stairs [Retired] : Work status: retired [1] : 1 [Gelsyn] : Gelsyn [de-identified] : The patient has continued pain in both knees.  Mild pain standing and walking.  Mild to moderate pain after sitting and getting up.   [] : Post Surgical Visit: no [FreeTextEntry1] : B Knees  [de-identified] : inj [de-identified] : 1/22/2024

## 2024-08-21 NOTE — IMAGING
Saw patient prior to photodynamic therapy to discuss risks, benefits and alternatives. 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    Also evaluated patient after treatment. Sufficient erythema was achieved.    He will follow-up with me in 6 months.   [de-identified] : Mild dystrophic gait  Right knee No swelling Mild medial facet and joint line tenderness Passive range of motion 0 to 120 degrees  Left knee No swelling Mild medial facet and joint line tenderness Passive range of motion 0 to 120 degrees  Both legs No swelling Calves are soft and nontender

## 2024-08-21 NOTE — PROCEDURE
[Large Joint Injection] : Large joint injection [Bilateral] : bilaterally of the [Knee] : knee [Pain] : pain [Alcohol] : alcohol [Betadine] : betadine [Ethyl Chloride sprayed topically] : ethyl chloride sprayed topically [Sterile technique used] : sterile technique used [Gel-Syn (16.8mg)] : 16.8mg of Gel-Syn [#2] : series #2 [] : Patient tolerated procedure well [Patient was advised to rest the joint(s) for ____ days] : patient was advised to rest the joint(s) for [unfilled] days [Risks, benefits, alternatives discussed / Verbal consent obtained] : the risks benefits, and alternatives have been discussed, and verbal consent was obtained [Altered anatomic landmarks d/t erosive arthritis] : altered anatomic landmarks d/t erosive arthritis [All ultrasound images have been permanently captured and stored accordingly in our picture archiving and communication system] : All ultrasound images have been permanently captured and stored accordingly in our picture archiving and communication system [#1] : series #1 [FreeTextEntry3] : Do not submerge underwater for 24 hours

## 2024-08-21 NOTE — DISCUSSION/SUMMARY
[de-identified] : I discussed repeat Gelsyn injections in both knees Risks including infection, swelling, stiffness, bleeding in addition to other associated risks with joint injection, benefits and alternatives were discussed with the patient Ice p.r.n. Celebrex 200 mg b.i.d. with food p.r.n..   He has appointment with Dr. 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 Aureliano Snyder January 9, 2020. Severe osteoarthritis right knee Moderate to severe osteoarthritis left knee

## 2024-08-21 NOTE — DISCUSSION/SUMMARY
[de-identified] : I discussed repeat Gelsyn injections in both knees Risks including infection, swelling, stiffness, bleeding in addition to other associated risks with joint injection, benefits and alternatives were discussed with the patient Ice p.r.n. Celebrex 200 mg b.i.d. with food p.r.n..   He has appointment with Dr. 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 Aureliano Snyder January 9, 2020. Severe osteoarthritis right knee Moderate to severe osteoarthritis left knee

## 2024-08-23 ENCOUNTER — RX ONLY (RX ONLY)
Age: 74
End: 2024-08-23

## 2024-08-23 ENCOUNTER — OFFICE (OUTPATIENT)
Dept: URBAN - METROPOLITAN AREA CLINIC 109 | Facility: CLINIC | Age: 74
Setting detail: OPHTHALMOLOGY
End: 2024-08-23
Payer: MEDICARE

## 2024-08-23 DIAGNOSIS — H25.12: ICD-10-CM

## 2024-08-23 DIAGNOSIS — Z96.1: ICD-10-CM

## 2024-08-23 PROCEDURE — 92136 OPHTHALMIC BIOMETRY: CPT | Mod: 26,LT | Performed by: OPHTHALMOLOGY

## 2024-08-23 PROCEDURE — 99024 POSTOP FOLLOW-UP VISIT: CPT | Performed by: OPHTHALMOLOGY

## 2024-08-23 ASSESSMENT — LID EXAM ASSESSMENTS
OD_MEIBOMITIS: RLL RUL 1+
OS_MEIBOMITIS: LLL LUL 1+

## 2024-08-23 ASSESSMENT — CONFRONTATIONAL VISUAL FIELD TEST (CVF)
OS_FINDINGS: FULL
OD_FINDINGS: FULL

## 2024-08-28 ENCOUNTER — APPOINTMENT (OUTPATIENT)
Dept: ORTHOPEDIC SURGERY | Facility: CLINIC | Age: 74
End: 2024-08-28
Payer: MEDICARE

## 2024-08-28 VITALS — HEIGHT: 72 IN | WEIGHT: 243 LBS | BODY MASS INDEX: 32.91 KG/M2

## 2024-08-28 PROCEDURE — 20611 DRAIN/INJ JOINT/BURSA W/US: CPT | Mod: 50

## 2024-08-28 RX ORDER — HYALURONATE SODIUM 16.8MG/2ML
16.8 SYRINGE (ML) INTRAARTICULAR
Refills: 0 | Status: COMPLETED | OUTPATIENT
Start: 2024-08-28

## 2024-08-28 RX ADMIN — Medication MG/2ML: at 00:00

## 2024-08-28 NOTE — HISTORY OF PRESENT ILLNESS
[Gradual] : gradual [3] : 3 [Dull/Aching] : dull/aching [Intermittent] : intermittent [Leisure] : leisure [Rest] : rest [Exercising] : exercising [Stairs] : stairs [Retired] : Work status: retired [2] : 2 [Gelsyn] : Gelsyn [de-identified] : The patient feels a little better after the first repeat Gelsyn injections of both knees.  He has mild pain standing and walking.  Mild to moderate pain at the sitting getting up.  Seen today with his wife, Yanique [] : Post Surgical Visit: no [FreeTextEntry1] : B Knees  [de-identified] : inj [de-identified] : 8/21/2024

## 2024-08-28 NOTE — DISCUSSION/SUMMARY
[de-identified] : Ice p.r.n. Celebrex 200 mg b.i.d. with food p.r.n..   He has appointment with Dr. 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 Aureliano Snyder January 9, 2020. Severe osteoarthritis right knee Moderate to severe osteoarthritis left knee

## 2024-08-28 NOTE — IMAGING
[de-identified] : Mild dystrophic gait  Right knee No swelling Mild medial facet and joint line tenderness Passive range of motion 0 to 120 degrees  Left knee No swelling Mild medial facet and joint line tenderness Passive range of motion 0 to 120 degrees  Both legs No swelling Calves are soft and nontender

## 2024-08-28 NOTE — IMAGING
[de-identified] : Mild dystrophic gait  Right knee No swelling Mild medial facet and joint line tenderness Passive range of motion 0 to 120 degrees  Left knee No swelling Mild medial facet and joint line tenderness Passive range of motion 0 to 120 degrees  Both legs No swelling Calves are soft and nontender

## 2024-08-28 NOTE — DISCUSSION/SUMMARY
[de-identified] : Ice p.r.n. Celebrex 200 mg b.i.d. with food p.r.n..   He has appointment with Dr. 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 Aureliano Snyder January 9, 2020. Severe osteoarthritis right knee Moderate to severe osteoarthritis left knee

## 2024-08-28 NOTE — HISTORY OF PRESENT ILLNESS
[Gradual] : gradual [3] : 3 [Dull/Aching] : dull/aching [Intermittent] : intermittent [Leisure] : leisure [Rest] : rest [Exercising] : exercising [Stairs] : stairs [Retired] : Work status: retired [2] : 2 [Gelsyn] : Gelsyn [de-identified] : The patient feels a little better after the first repeat Gelsyn injections of both knees.  He has mild pain standing and walking.  Mild to moderate pain at the sitting getting up.  Seen today with his wife, Yanique [] : Post Surgical Visit: no [FreeTextEntry1] : B Knees  [de-identified] : inj [de-identified] : 8/21/2024

## 2024-09-03 ENCOUNTER — APPOINTMENT (OUTPATIENT)
Dept: ORTHOPEDIC SURGERY | Facility: CLINIC | Age: 74
End: 2024-09-03
Payer: MEDICARE

## 2024-09-03 VITALS — HEIGHT: 72 IN | BODY MASS INDEX: 32.91 KG/M2 | WEIGHT: 243 LBS

## 2024-09-03 PROCEDURE — 20611 DRAIN/INJ JOINT/BURSA W/US: CPT | Mod: 50

## 2024-09-03 RX ORDER — HYALURONATE SODIUM 16.8MG/2ML
16.8 SYRINGE (ML) INTRAARTICULAR
Refills: 0 | Status: COMPLETED | OUTPATIENT
Start: 2024-09-03

## 2024-09-03 RX ADMIN — Medication MG/2ML: at 00:00

## 2024-09-03 NOTE — PROCEDURE
[Large Joint Injection] : Large joint injection [Bilateral] : bilaterally of the [Knee] : knee [Pain] : pain [Alcohol] : alcohol [Betadine] : betadine [Ethyl Chloride sprayed topically] : ethyl chloride sprayed topically [Sterile technique used] : sterile technique used [Gel-Syn (16.8mg)] : 16.8mg of Gel-Syn [] : Patient tolerated procedure well [Patient was advised to rest the joint(s) for ____ days] : patient was advised to rest the joint(s) for [unfilled] days [Risks, benefits, alternatives discussed / Verbal consent obtained] : the risks benefits, and alternatives have been discussed, and verbal consent was obtained [Altered anatomic landmarks d/t erosive arthritis] : altered anatomic landmarks d/t erosive arthritis [All ultrasound images have been permanently captured and stored accordingly in our picture archiving and communication system] : All ultrasound images have been permanently captured and stored accordingly in our picture archiving and communication system [#3] : series #3 [FreeTextEntry3] : Do not submerge underwater for 24 hours

## 2024-09-03 NOTE — IMAGING
[de-identified] : Mild dystrophic gait  Right knee No swelling Mild medial facet and joint line tenderness Passive range of motion 0 to 120 degrees  Left knee No swelling Mild medial facet and joint line tenderness Passive range of motion 0 to 120 degrees  Both legs No swelling Calves are soft and nontender

## 2024-09-03 NOTE — DISCUSSION/SUMMARY
[de-identified] : Ice p.r.n. Celebrex 200 mg b.i.d. with food p.r.n..   He has appointment with Dr. oM Hampton on September 4 to discuss doing knee replacements in January  Impression: Lumbosacral strain/spondylosis/radiculopathy/stenosis L2-3 Status post fusion L3-4 by Dr. Aureliano Snyder January 9, 2020. Severe osteoarthritis right knee Moderate to severe osteoarthritis left knee

## 2024-09-03 NOTE — HISTORY OF PRESENT ILLNESS
[Gradual] : gradual [Dull/Aching] : dull/aching [Intermittent] : intermittent [Leisure] : leisure [Rest] : rest [Exercising] : exercising [Stairs] : stairs [Retired] : Work status: retired [3] : 3 [Gelsyn] : Gelsyn [de-identified] : The patient feels a little better after the second repeat Gelsyn injections of both knees.  He has mild pain standing and walking.  Mild to moderate pain at the sitting getting up.  Seen today with his wife, Yanique [] : Post Surgical Visit: no [FreeTextEntry1] : B Knees  [de-identified] : inj [de-identified] : 8/28/24 [de-identified] : kacie knee

## 2024-09-04 ENCOUNTER — APPOINTMENT (OUTPATIENT)
Dept: ORTHOPEDIC SURGERY | Facility: CLINIC | Age: 74
End: 2024-09-04
Payer: MEDICARE

## 2024-09-04 VITALS
WEIGHT: 250 LBS | BODY MASS INDEX: 33.86 KG/M2 | HEIGHT: 72 IN | HEART RATE: 139 BPM | SYSTOLIC BLOOD PRESSURE: 111 MMHG | DIASTOLIC BLOOD PRESSURE: 72 MMHG

## 2024-09-04 DIAGNOSIS — M17.0 BILATERAL PRIMARY OSTEOARTHRITIS OF KNEE: ICD-10-CM

## 2024-09-04 PROCEDURE — G2211 COMPLEX E/M VISIT ADD ON: CPT

## 2024-09-04 PROCEDURE — 99204 OFFICE O/P NEW MOD 45 MIN: CPT

## 2024-09-04 PROCEDURE — 73564 X-RAY EXAM KNEE 4 OR MORE: CPT | Mod: 50

## 2024-09-04 NOTE — END OF VISIT
[FreeTextEntry3] : I, Reyes Quijano, acted solely as a scribe for Dr. Mo Hampton on this date 09/04/2024. I personally performed the services described in the documentation, reviewed the documentation recorded by the scribe in my presence, and it accurately and completely records my words and actions.   I, Mo Hampton MD, personally performed the services described in the documentation, reviewed the documentation recorded by the scribe in my presence and it accurately and completely records my words and actions.

## 2024-09-04 NOTE — HISTORY OF PRESENT ILLNESS
[Worsening] : worsening [___ yrs] : [unfilled] year(s) ago [6] : a current pain level of 6/10 [8] : a maximum pain level of 8/10 [de-identified] : This is a 73 year old M pt presents today for bilateral knee pain, right worse than left. Pt denies any trauma or injury regarding the knees. Pt has been having the pain for years. The pain locates at the medial aspect of the knees with a dull, aching feeling. Pt has been having Gelsyn injections in the past 5 years and getting relief. However, he notes that the pain worsens recently. The pt is having trouble with prolonged walking, stairs, and getting up from a seated position. Pt is here to discuss surgical options.

## 2024-09-04 NOTE — DISCUSSION/SUMMARY
[Medication Risks Reviewed] : Medication risks reviewed [Surgical risks reviewed] : Surgical risks reviewed [de-identified] : This is a 73 year old M pt presents today with bone on bone medial compartmental osteoarthritis of the right knee, and advanced medial compartmental osteoarthritis of the left knee. The nature of condition and treatment options were discussed in detail. Pt is a candidate for right TKA and a future candidate for left TKA. The surgery was discussed in detail including pre-op and post-op. The pt is having worse pain with walking, stairs, exercise, getting up from seated position. His quality of life is deteriorating. The pt has exhausted over 3 months of conservative treatment including HA injections, home therapy, anti-inflammatories, Tylenol. I believe right TKA is reasonable. The pt will talk with the surgical coordinator to schedule a right TKA. All questions and concerns were answered.  The patient is a 73 year old individual with end stage arthritis of their right knee joint. The patient has exhausted a minimum of 3 months conservative treatment including prior injections (cortisone and/or hyaluronic acid injections), physical therapy, over the counter NSAIDS and pain medication where indicated. In addition, the patient's quality of life is diminished due to significant chronic pain. The patient is having difficulty with activities of daily living, including ambulating, descending stairs, and rising from a seated position. Based upon the patients continued symptoms and failure to respond to conservative treatment, I have recommended a right total knee replacement for this patient. A long discussion took place with the patient describing what a total joint replacement is and what the procedure would entail. A knee model, similar to the implant that will be used during the operation, was utilized to demonstrate and to discuss the various bearing surfaces of the implants. Implant fixation, use of cement, was also discussed with the patient. Choices of implant manufacturers, mainly [employer] and [employer], were discussed and reviewed with preference to be made by patient and surgeon prior to operation. Final selection to be based on customary practice as well as preoperative templating with selection confirmed intraoperatively based on the patient's anatomy. The patient participated and agreed with the decision-making process. The hospitalization and post-operative care and rehabilitation were also discussed. The use of perioperative antibiotics and DVT prophylaxis were discussed. The risk, benefits and alternatives to a surgical intervention were discussed at length with the patient. The patient was also advised of risks related to the medical comorbidities and elevated body mass index (BMI). A lengthy discussion took place to review the most common complications including but not limited to: deep vein thrombosis, pulmonary embolism, heart attack, stroke, infection, wound breakdown, numbness, damage to nerves, tendon, muscles, arteries or other blood vessels, death and other possible complications from anesthesia. The patient was told that we will take steps to minimize these risks by using sterile technique, antibiotics and DVT prophylaxis when appropriate and follow the patient postoperatively in the office setting to monitor progress. The possibility of recurrent pain, no improvement in pain and actual worsening of pain were also discussed with the patient.   The discharge plan of care focused on the patient going home following surgery. The patient was encouraged to make the necessary arrangements to have someone stay with them when they are discharged home. Following discharge, a home care nurse will visit the patient. The home care nurse will open your home care case and request home physical therapy services. Home physical therapy will commence following discharge provided it is appropriate and covered by the health insurance benefit plan.   The benefits of surgery were discussed with the patient including the potential for improving his/her current clinical condition through operative intervention. Alternatives to surgical intervention including continued conservative management were also discussed in detail. All questions were answered to the satisfaction of the patient. The treatment plan of care, as well as a model of a total knee equivalent to the one that will be used for their total joint replacement, was shared with the patient. The patient participated and agreed to the plan of care as well as the use of the recommended implants for their total joint replacement surgery.

## 2024-09-04 NOTE — PHYSICAL EXAM
[LE] : Sensory: Intact in bilateral lower extremities [ALL] : dorsalis pedis, posterior tibial, femoral, popliteal, and radial 2+ and symmetric bilaterally [Normal] : Alert and in no acute distress [Poor Appearance] : well-appearing [de-identified] : GENERAL APPEARANCE: Well nourished and hydrated, pleasant, alert, and oriented x 3. Appears their stated age. HEENT: Normocephalic, extraocular eye motion intact. Nasal septum midline. Oral cavity clear. External auditory canal clear. RESPIRATORY: Breath sounds clear and audible in all lobes. No wheezing, No accessory muscle use. CARDIOVASCULAR: No apparent abnormalities. No lower leg edema. No varicosities. Pedal pulses are palpable. NEUROLOGIC: Sensation is normal, no muscle weakness in the upper or lower extremities. DERMATOLOGIC: No apparent skin lesions, moist, warm, no rash. SPINE: Cervical spine appears normal and moves freely; thoracic spine appears normal and moves freely; lumbosacral spine appears normal and moves freely, normal, nontender. MUSCULOSKELETAL: Hands, wrists, and elbows are normal and move freely, shoulders are normal and move freely. PSYCHIATRIC: Oriented to person, place, and time, insight and judgement were intact and the affect was normal. [de-identified] : Bilateral knee exam shows medial jointline tenderness, ROM 0-120.  [de-identified] : 4V xray of the bilateral knee done in the office today and reviewed by Dr. Mo Hampton demonstrates bone on bone medial compartmental osteoarthritis of the right knee, and advanced medial compartmental osteoarthritis of the left knee.

## 2024-09-05 ENCOUNTER — AMBULATORY SURGERY CENTER (OUTPATIENT)
Dept: URBAN - METROPOLITAN AREA SURGERY 19 | Facility: SURGERY | Age: 74
Setting detail: OPHTHALMOLOGY
End: 2024-09-05
Payer: COMMERCIAL

## 2024-09-05 DIAGNOSIS — H25.12: ICD-10-CM

## 2024-09-05 DIAGNOSIS — H52.4: ICD-10-CM

## 2024-09-05 PROCEDURE — V2788P PANOPTIX: Performed by: OPHTHALMOLOGY

## 2024-09-05 PROCEDURE — 66984 XCAPSL CTRC RMVL W/O ECP: CPT | Mod: 79,LT | Performed by: OPHTHALMOLOGY

## 2024-09-06 ENCOUNTER — OFFICE (OUTPATIENT)
Dept: URBAN - METROPOLITAN AREA CLINIC 109 | Facility: CLINIC | Age: 74
Setting detail: OPHTHALMOLOGY
End: 2024-09-06
Payer: COMMERCIAL

## 2024-09-06 DIAGNOSIS — Z96.1: ICD-10-CM

## 2024-09-06 PROCEDURE — 99024 POSTOP FOLLOW-UP VISIT: CPT | Performed by: OPTOMETRIST

## 2024-09-06 ASSESSMENT — CONFRONTATIONAL VISUAL FIELD TEST (CVF)
OD_FINDINGS: FULL
OS_FINDINGS: FULL

## 2024-09-06 ASSESSMENT — LID EXAM ASSESSMENTS
OS_MEIBOMITIS: LLL LUL 1+
OD_MEIBOMITIS: RLL RUL 1+

## 2024-09-20 ENCOUNTER — OFFICE (OUTPATIENT)
Dept: URBAN - METROPOLITAN AREA CLINIC 109 | Facility: CLINIC | Age: 74
Setting detail: OPHTHALMOLOGY
End: 2024-09-20
Payer: COMMERCIAL

## 2024-09-20 DIAGNOSIS — Z96.1: ICD-10-CM

## 2024-09-20 PROCEDURE — 99024 POSTOP FOLLOW-UP VISIT: CPT | Performed by: OPTOMETRIST

## 2024-09-20 ASSESSMENT — LID EXAM ASSESSMENTS
OS_MEIBOMITIS: LLL LUL 1+
OD_MEIBOMITIS: RLL RUL 1+

## 2024-10-01 ENCOUNTER — APPOINTMENT (OUTPATIENT)
Dept: ORTHOPEDIC SURGERY | Facility: CLINIC | Age: 74
End: 2024-10-01

## 2024-10-01 VITALS — WEIGHT: 250 LBS | HEIGHT: 72 IN | BODY MASS INDEX: 33.86 KG/M2

## 2024-10-01 DIAGNOSIS — M70.22 OLECRANON BURSITIS, LEFT ELBOW: ICD-10-CM

## 2024-10-01 PROCEDURE — 99213 OFFICE O/P EST LOW 20 MIN: CPT | Mod: 25

## 2024-10-01 PROCEDURE — 20605 DRAIN/INJ JOINT/BURSA W/O US: CPT | Mod: LT

## 2024-10-01 PROCEDURE — 73080 X-RAY EXAM OF ELBOW: CPT | Mod: LT

## 2024-10-01 NOTE — HISTORY OF PRESENT ILLNESS
[4] : 4 [Dull/Aching] : dull/aching [Intermittent] : intermittent [Household chores] : household chores [Leisure] : leisure [Rest] : rest [Ice] : ice [Retired] : Work status: retired

## 2024-10-28 ENCOUNTER — APPOINTMENT (OUTPATIENT)
Dept: ORTHOPEDIC SURGERY | Facility: CLINIC | Age: 74
End: 2024-10-28
Payer: MEDICARE

## 2024-10-28 VITALS — WEIGHT: 250 LBS | BODY MASS INDEX: 33.86 KG/M2 | HEIGHT: 72 IN

## 2024-10-28 DIAGNOSIS — M16.0 BILATERAL PRIMARY OSTEOARTHRITIS OF HIP: ICD-10-CM

## 2024-10-28 DIAGNOSIS — S39.012D STRAIN OF MUSCLE, FASCIA AND TENDON OF LOWER BACK, SUBSEQUENT ENCOUNTER: ICD-10-CM

## 2024-10-28 DIAGNOSIS — M47.817 SPONDYLOSIS W/OUT MYELOPATHY OR RADICULOPATHY, LUMBOSACRAL REGION: ICD-10-CM

## 2024-10-28 DIAGNOSIS — M70.62 TROCHANTERIC BURSITIS, RIGHT HIP: ICD-10-CM

## 2024-10-28 DIAGNOSIS — M70.61 TROCHANTERIC BURSITIS, RIGHT HIP: ICD-10-CM

## 2024-10-28 PROCEDURE — 73502 X-RAY EXAM HIP UNI 2-3 VIEWS: CPT

## 2024-10-28 PROCEDURE — 99214 OFFICE O/P EST MOD 30 MIN: CPT

## 2024-10-28 PROCEDURE — 72100 X-RAY EXAM L-S SPINE 2/3 VWS: CPT

## 2024-10-28 RX ORDER — METHYLPREDNISOLONE 4 MG/1
4 TABLET ORAL
Qty: 1 | Refills: 0 | Status: ACTIVE | COMMUNITY
Start: 2024-10-28 | End: 1900-01-01

## 2024-11-13 ENCOUNTER — APPOINTMENT (OUTPATIENT)
Dept: ORTHOPEDIC SURGERY | Facility: CLINIC | Age: 74
End: 2024-11-13

## 2024-11-13 ENCOUNTER — NON-APPOINTMENT (OUTPATIENT)
Age: 74
End: 2024-11-13

## 2024-11-14 ENCOUNTER — NON-APPOINTMENT (OUTPATIENT)
Age: 74
End: 2024-11-14

## 2024-12-30 ENCOUNTER — APPOINTMENT (OUTPATIENT)
Dept: ORTHOPEDIC SURGERY | Facility: CLINIC | Age: 74
End: 2024-12-30
Payer: MEDICARE

## 2024-12-30 VITALS — BODY MASS INDEX: 33.86 KG/M2 | WEIGHT: 250 LBS | HEIGHT: 72 IN

## 2024-12-30 DIAGNOSIS — M48.07 SPINAL STENOSIS, LUMBOSACRAL REGION: ICD-10-CM

## 2024-12-30 DIAGNOSIS — M47.817 SPONDYLOSIS W/OUT MYELOPATHY OR RADICULOPATHY, LUMBOSACRAL REGION: ICD-10-CM

## 2024-12-30 DIAGNOSIS — S39.012D STRAIN OF MUSCLE, FASCIA AND TENDON OF LOWER BACK, SUBSEQUENT ENCOUNTER: ICD-10-CM

## 2024-12-30 DIAGNOSIS — M17.0 BILATERAL PRIMARY OSTEOARTHRITIS OF KNEE: ICD-10-CM

## 2024-12-30 PROCEDURE — 20611 DRAIN/INJ JOINT/BURSA W/US: CPT | Mod: 50

## 2024-12-30 PROCEDURE — 99214 OFFICE O/P EST MOD 30 MIN: CPT | Mod: 25

## 2024-12-30 RX ADMIN — Medication MG: at 00:00

## 2025-01-28 ENCOUNTER — APPOINTMENT (OUTPATIENT)
Dept: ORTHOPEDIC SURGERY | Facility: HOSPITAL | Age: 75
End: 2025-01-28

## 2025-02-18 ENCOUNTER — APPOINTMENT (OUTPATIENT)
Dept: ORTHOPEDIC SURGERY | Facility: CLINIC | Age: 75
End: 2025-02-18

## 2025-03-04 ENCOUNTER — APPOINTMENT (OUTPATIENT)
Dept: ORTHOPEDIC SURGERY | Facility: CLINIC | Age: 75
End: 2025-03-04
Payer: MEDICARE

## 2025-03-04 VITALS — HEIGHT: 72 IN | BODY MASS INDEX: 33.86 KG/M2 | WEIGHT: 250 LBS

## 2025-03-04 PROCEDURE — 99213 OFFICE O/P EST LOW 20 MIN: CPT | Mod: 25

## 2025-03-04 PROCEDURE — 20611 DRAIN/INJ JOINT/BURSA W/US: CPT | Mod: 50

## 2025-03-04 RX ORDER — RIVAROXABAN 20 MG/1
20 TABLET, FILM COATED ORAL
Refills: 0 | Status: ACTIVE | COMMUNITY

## 2025-03-04 RX ORDER — HYALURONATE SODIUM 16.8MG/2ML
16.8 SYRINGE (ML) INTRAARTICULAR
Refills: 0 | Status: COMPLETED | OUTPATIENT
Start: 2025-03-04

## 2025-03-04 RX ORDER — VERAPAMIL HYDROCHLORIDE 100 MG/1
100 CAPSULE, EXTENDED RELEASE ORAL
Refills: 0 | Status: ACTIVE | COMMUNITY

## 2025-03-04 RX ADMIN — Medication MG/2ML: at 00:00

## 2025-03-08 NOTE — IMAGING
[de-identified] : Mild dystrophic gait\par \par Right knee\par No swelling\par Mild medial facet and joint line tenderness\par Passive range of motion 0° to 120°\par \par Left knee\par No swelling\par Mild medial facet and joint line tenderness\par Passive range of motion 0° to 120°\par \par \par Both legs\par No swelling\par Calves are soft and nontender\par 
decreased safety awareness/decreased sequencing ability/decreased weight-shifting ability

## 2025-03-11 ENCOUNTER — APPOINTMENT (OUTPATIENT)
Dept: BARIATRICS | Facility: CLINIC | Age: 75
End: 2025-03-11
Payer: MEDICARE

## 2025-03-11 VITALS
HEIGHT: 72 IN | OXYGEN SATURATION: 95 % | TEMPERATURE: 98.2 F | BODY MASS INDEX: 34.85 KG/M2 | WEIGHT: 257.28 LBS | SYSTOLIC BLOOD PRESSURE: 142 MMHG | DIASTOLIC BLOOD PRESSURE: 82 MMHG | HEART RATE: 126 BPM

## 2025-03-11 DIAGNOSIS — Z83.3 FAMILY HISTORY OF DIABETES MELLITUS: ICD-10-CM

## 2025-03-11 DIAGNOSIS — E46 UNSPECIFIED PROTEIN-CALORIE MALNUTRITION: ICD-10-CM

## 2025-03-11 DIAGNOSIS — Z71.82 EXERCISE COUNSELING: ICD-10-CM

## 2025-03-11 DIAGNOSIS — I48.91 UNSPECIFIED ATRIAL FIBRILLATION: ICD-10-CM

## 2025-03-11 DIAGNOSIS — E66.9 OBESITY, UNSPECIFIED: ICD-10-CM

## 2025-03-11 PROCEDURE — 99205 OFFICE O/P NEW HI 60 MIN: CPT

## 2025-03-11 PROCEDURE — G2212 PROLONG OUTPT/OFFICE VIS: CPT

## 2025-03-11 PROCEDURE — G2211 COMPLEX E/M VISIT ADD ON: CPT

## 2025-03-12 ENCOUNTER — APPOINTMENT (OUTPATIENT)
Dept: ORTHOPEDIC SURGERY | Facility: CLINIC | Age: 75
End: 2025-03-12
Payer: MEDICARE

## 2025-03-12 PROCEDURE — 20611 DRAIN/INJ JOINT/BURSA W/US: CPT | Mod: 50

## 2025-03-12 RX ORDER — HYALURONATE SODIUM 16.8MG/2ML
16.8 SYRINGE (ML) INTRAARTICULAR
Refills: 0 | Status: COMPLETED | OUTPATIENT
Start: 2025-03-12

## 2025-03-12 RX ADMIN — Medication MG/2ML: at 00:00

## 2025-03-19 ENCOUNTER — APPOINTMENT (OUTPATIENT)
Dept: ORTHOPEDIC SURGERY | Facility: CLINIC | Age: 75
End: 2025-03-19
Payer: MEDICARE

## 2025-03-19 DIAGNOSIS — M17.0 BILATERAL PRIMARY OSTEOARTHRITIS OF KNEE: ICD-10-CM

## 2025-03-19 PROCEDURE — 20611 DRAIN/INJ JOINT/BURSA W/US: CPT | Mod: 50

## 2025-03-19 RX ORDER — HYALURONATE SODIUM 16.8MG/2ML
16.8 SYRINGE (ML) INTRAARTICULAR
Refills: 0 | Status: COMPLETED | OUTPATIENT
Start: 2025-03-19

## 2025-03-19 RX ADMIN — Medication MG/2ML: at 00:00

## 2025-04-09 ENCOUNTER — APPOINTMENT (OUTPATIENT)
Dept: ORTHOPEDIC SURGERY | Facility: CLINIC | Age: 75
End: 2025-04-09

## 2025-04-10 ENCOUNTER — APPOINTMENT (OUTPATIENT)
Dept: BARIATRICS/WEIGHT MGMT | Facility: CLINIC | Age: 75
End: 2025-04-10

## 2025-04-22 ENCOUNTER — APPOINTMENT (OUTPATIENT)
Dept: BARIATRICS | Facility: CLINIC | Age: 75
End: 2025-04-22

## 2025-05-21 ENCOUNTER — NON-APPOINTMENT (OUTPATIENT)
Age: 75
End: 2025-05-21

## 2025-07-08 ENCOUNTER — APPOINTMENT (OUTPATIENT)
Dept: ORTHOPEDIC SURGERY | Facility: CLINIC | Age: 75
End: 2025-07-08
Payer: MEDICARE

## 2025-07-08 VITALS — WEIGHT: 257 LBS | HEIGHT: 72 IN | BODY MASS INDEX: 34.81 KG/M2

## 2025-07-08 PROBLEM — M17.12 PRIMARY OSTEOARTHRITIS OF LEFT KNEE: Status: ACTIVE | Noted: 2025-07-08

## 2025-07-08 PROCEDURE — 99214 OFFICE O/P EST MOD 30 MIN: CPT | Mod: 25

## 2025-07-08 PROCEDURE — 20611 DRAIN/INJ JOINT/BURSA W/US: CPT | Mod: 50

## 2025-07-08 RX ADMIN — Medication MG: at 00:00

## 2025-08-20 ENCOUNTER — APPOINTMENT (OUTPATIENT)
Dept: ORTHOPEDIC SURGERY | Facility: CLINIC | Age: 75
End: 2025-08-20